# Patient Record
Sex: FEMALE | URBAN - METROPOLITAN AREA
[De-identification: names, ages, dates, MRNs, and addresses within clinical notes are randomized per-mention and may not be internally consistent; named-entity substitution may affect disease eponyms.]

---

## 2020-02-19 ENCOUNTER — APPOINTMENT (RX ONLY)
Dept: URBAN - METROPOLITAN AREA CLINIC 23 | Facility: CLINIC | Age: 30
Setting detail: DERMATOLOGY
End: 2020-02-19

## 2020-02-19 DIAGNOSIS — Z41.9 ENCOUNTER FOR PROCEDURE FOR PURPOSES OTHER THAN REMEDYING HEALTH STATE, UNSPECIFIED: ICD-10-CM

## 2020-02-19 PROCEDURE — ? FILLERS

## 2020-02-19 PROCEDURE — ? IN-HOUSE DISPENSING PHARMACY

## 2020-02-19 NOTE — PROCEDURE: IN-HOUSE DISPENSING PHARMACY
Product 49 Refills: 0
Product 32 Unit Type: mg
Product 1 Application Directions: Apply to eyelids am and pm
Product 3 Amount/Unit (Numbers Only): 1
Product 15 Application Directions: Take one tablet twice daily for 3-6 months
Name Of Product 8: Valium 5 mg
Name Of Product 5: Prednisone 20mg
Product 13 Refills: 2
Product 15 Amount/Unit (Numbers Only): 2614 [facility]
Product 1 Unit Type: ml
Product 2 Unit Type: jar(s)
Product 6 Application Directions: Apply pea size amount to entire face at bedtime only.
Product 10 Application Directions: Take two tablets with water prior to procedure.
Product 11 Application Directions: Apply to the affected area of the body daily.
Product 12 Application Directions: Apply to the entire face in the Am and Pm
Name Of Product 2: Ava West
Product 21 Application Directions: Use as directed
Product 8 Application Directions: Take one tablet today one hour prior to procedure as indicated.
Product 6 Amount/Unit (Numbers Only): 6058 [facility]
Name Of Product 1: Latiesse 3 ml
Product 21 Unit Type: kit(s)
Product 6 Unit Type: grams
Product 4 Application Directions: Take 1 tablet today in office post injections  for swelling as indicated.
Product 12 Unit Type: bottle(s)
Name Of Product 7: Latisse 5ml
Name Of Product 13: Exfoliate Glycolic Cleanser
Product 4 Unit Type: tablets
Send Charges To Patient Encounter: No
Product 1 Amount/Unit (Numbers Only): 3
Product 7 Application Directions: Apply to each eye lashes at bedtime
Product 13 Application Directions: Wash the face in the AM and PM
Product 14 Application Directions: Take one tablet in the Am and Pm x 3 days
Product 9 Application Directions: Apply to affected area perioral area at bedtime only as indicated.
Name Of Product 3: Prednisone 10 mg
Product 5 Application Directions: Take one tablet by mouth tomorrow as indicated for swelling
Product 10 Amount/Unit (Numbers Only): 5
Product 14 Amount/Unit (Numbers Only): [address]
Name Of Product 15: Viviscal PRO
Product 1 Price/Unit (In Dollars): 0.00
Name Of Product 21: Defenage skincare
Product 2 Application Directions: Apply at bedtime
Product 3 Application Directions: Take 1 tablet today and then 1 tablet tommorow  as indicated
Name Of Product 14: Valtrex 500mg
Name Of Product 10: Valium
Name Of Product 6: Tretinoin 0.05% cream
Name Of Product 12: Skin Better Even tone Serum
Name Of Product 11: Hydroquinone 6% pads
Detail Level: Detailed
Name Of Product 9: Brightening pads 6%

## 2020-02-19 NOTE — PROCEDURE: FILLERS
Additional Area 5 Location: oral commissures, upper lip lines, vermillion lips
Jawline Filler Volume In Cc: 0
Map Statment: See [address] for Complete Details
Lot #: [id number]
Include Cannula Brand?: DermaSculpt
Expiration Date (Month Year): 03/24/21
Consent: Written consent obtained. Risks include but not limited to bruising, beading, irregular texture, ulceration, infection, allergic reaction, scar formation, incomplete augmentation, temporary nature, procedural pain.
Additional Area 2 Location: Remaining syringe will be injected in 7 days post hydase injections to tear through areas.
Lot #: [id number]
Include Cannula Information In Note?: No
Filler: Restylane Defyne
Post-Care Instructions: Patient instructed to apply ice to reduce swelling.
Include Cannula Size?: 25G
Additional Area 3 Location: vermillion lips, tear through, lateral cheeks
Additional Area 4 Location: oral commissures, jawline, marionette lines
Lot #: [id number]
Include Cannula Length?: 1.5 inch
Additional Area 5 Location: lateral cheeks, NLFs,chin
Additional Area 1 Location: lateral jawline.
Expiration Date (Month Year): 05/19/2020
Anesthesia Type: 1% lidocaine with epinephrine
Detail Level: Detailed
Additional Area 2 Location: oral commissures ,marionettes lines , upper lip lines
Anesthesia Volume In Cc: 0.3
Additional Area 3 Location: perioral fine lines, vermillion lips
Additional Area 4 Location: vermillion lips, marionettes lines
Price (Use Numbers Only, No Special Characters Or $): 0.00
Additional Area 1 Location: chin
Additional Area 5 Volume In Cc: 1
Additional Area 5 Location: jawlines, lateral cheeks
Additional Anesthesia Volume In Cc: 6
Lot #: [id number]
Additional Area 1 Location: lateral cheeks, NLFâs
Expiration Date (Month Year): 94/30/21

## 2020-06-16 ENCOUNTER — APPOINTMENT (RX ONLY)
Dept: URBAN - METROPOLITAN AREA CLINIC 23 | Facility: CLINIC | Age: 30
Setting detail: DERMATOLOGY
End: 2020-06-16

## 2020-06-16 DIAGNOSIS — Z41.9 ENCOUNTER FOR PROCEDURE FOR PURPOSES OTHER THAN REMEDYING HEALTH STATE, UNSPECIFIED: ICD-10-CM

## 2020-06-16 PROCEDURE — ? SCULPTRA

## 2020-06-16 PROCEDURE — ? PICOWAY LASER

## 2020-06-16 PROCEDURE — ? PULSED-DYE LASER

## 2020-06-16 PROCEDURE — ? FILLERS

## 2020-06-16 NOTE — PROCEDURE: FILLERS
Brows Filler  Volume In Cc: 0
Additional Area 3 Location: marionette lines, oral commissures chin lines
Lot #: [id number]
Map Statment: See [address] for Complete Details
Include Cannula Length?: 1.5 inch
Include Cannula Size?: 25G
Include Cannula Information In Note?: No
Additional Area 1 Location: tear through, lateral mouth, marionette lines
Expiration Date (Month Year): 06/30/22
Anesthesia Type: 1% lidocaine with epinephrine
Additional Area 4 Location: cheeks, oral commissures, marionette lines
Vermilion Lips Filler Volume In Cc: 1
Lot #: [id number]
Additional Area 5 Location: jawline, lateral cheeks
Additional Area 1 Location: lateral mouth, marionette lines, lateral cheeks.
Additional Area 2 Location: oral commissures ,marionettes lines , upper lip lines
Anesthesia Volume In Cc: 0.3
Expiration Date (Month Year): 05/19/2020
Include Cannula Brand?: DermaSculpt
Lot #: [id number]
Additional Area 3 Location: perioral fine lines, vermillion lips, NLFs. marionette lines upper lip lines
Additional Anesthesia Volume In Cc: 6
Additional Area 1 Location: chin
Additional Area 4 Location: era lobes, jawline
Expiration Date (Month Year): 01/312022
Detail Level: Detailed
Additional Area 5 Location: left cheek
Additional Area 1 Location: lips, vermillion lips, chin
Price (Use Numbers Only, No Special Characters Or $): 0.00
Consent: Written consent obtained. Risks include but not limited to bruising, beading, irregular texture, ulceration, infection, allergic reaction, scar formation, incomplete augmentation, temporary nature, procedural pain.
Lot #: [id number]
Post-Care Instructions: Patient instructed to apply ice to reduce swelling.
Filler: Restylane-L
Additional Area 5 Location: oral commissures, upper lip lines, vermillion lips

## 2020-06-16 NOTE — PROCEDURE: [place] LASER
Total Pulses: 5 HZ
Post-Procedure Care: Immediate endpoint: perifollicular erythema and edema. Vaseline and ice applied. Post care reviewed with patient.
Wavelength: 1064 nm
Wavelength: 532 nm
Fluence (J/Cm2): 0.4
Hide Pulse Duration?: No
Pulse Duration: 2.5 ms
Fluence (J/Cm2): 1.9
Treatment Number: 0
Fluence (J/Cm2): 0.7
Spot Size: 6.0 mm
Detail Level: Detailed
Consent: Written consent obtained, risks reviewed including but not limited to crusting, scabbing, blistering, scarring, darker or lighter pigmentary change, paradoxical hair regrowth, incomplete removal of hair and infection.
Total Pulses: 1 pass
Post-Care Instructions: I reviewed with the patient in detail post-care instructions. Patient should avoid sun for a minimum of 4 weeks before and after treatment.
Total Pulses: 2 passes
Anesthesia Type: 1% lidocaine with epinephrine
Location Override: test spot (brown spot face)
Total Pulses: Hz- 6 Hz.
Spot Size: 8.0 mm
Pre-Procedure: Prior to proceeding the treatment areas were cleaned and all present put on their eye protection.

## 2020-06-16 NOTE — PROCEDURE: SCULPTRA
Show First Additional Location?: Yes
Additional Area 4 Volume In Cc: 0
Anesthesia Type: 1% lidocaine with epinephrine
Vials Reconstituted: 1
Show Right And Left Temple Volume?: No
Lot #: [id number]
Additional Area 1 Location: buttocks
Post-Care Instructions: Patient instructed to apply ice to reduce swelling.
Cheeks Volume In Cc: 8
Anesthesia Volume In Cc: 2
Detail Level: Detailed
Dilution Method: The Sculptra was diluted with 10 of saline water and 1cc of plain lidocaine  for a total volume of 11ccs per vial.
Expiration Date (Month Year): 11/30/22
Additional Area 2 Location: cheeks, lateral jawline, NLFs, lateral cheeks (cannula used)
Injection Technique: The Sculptra was injected using a cannula to the listed areas after cleansing the skin and providing appropriate anesthesia.
Consent: Written consent obtained. Risks include but not limited to bruising, beading, irregular texture, ulceration, infection, allergic reaction, scar formation, incomplete augmentation, temporary nature, procedural pain.
Volumizer: Sculptra
Additional Anesthesia Volume In Cc: 6
Map Statement: See Attached Map for Complete Details.

## 2020-06-16 NOTE — PROCEDURE: PULSED-DYE LASER
Cryogen Time (Ms): 59968 Renato Yanez
Cryogen Time (Ms): 10
Pulse Duration: 10 ms
Spot Size: 10 mm
Fluence In J/Cm2 (Optional): 6.50
Consent: Written consent obtained, risks reviewed including but not limited to crusting, scabbing, blistering, scarring, darker or lighter pigmentary change, incidental hair removal, bruising, and/or incomplete removal.
Treated Area: large area
Spot Size: 7 mm
Post-Care Instructions: I reviewed with the patient in detail post-care instructions. Patient should stay away from the sun and wear sun protection until treated areas are fully healed.
Laser Type: Vbeam 595nm
Treated Area: small area
Fluence In J/Cm2 (Optional): 11.00
Delay Time (Ms): 5159 [facility]
Delay Time (Ms): 20
Detail Level: Detailed
Cryogen Time (Ms): 30
Immediate Endpoint: purpura
Spot Size: 10x3 mm
Cryogen Time (Ms): 27

## 2020-06-17 ENCOUNTER — RX ONLY (OUTPATIENT)
Age: 30
Setting detail: RX ONLY
End: 2020-06-17

## 2020-06-22 ENCOUNTER — APPOINTMENT (RX ONLY)
Dept: URBAN - METROPOLITAN AREA CLINIC 23 | Facility: CLINIC | Age: 30
Setting detail: DERMATOLOGY
End: 2020-06-22

## 2020-06-22 DIAGNOSIS — Z41.9 ENCOUNTER FOR PROCEDURE FOR PURPOSES OTHER THAN REMEDYING HEALTH STATE, UNSPECIFIED: ICD-10-CM

## 2020-06-22 PROCEDURE — ? ADDITIONAL NOTES

## 2020-06-22 PROCEDURE — ? PULSED-DYE LASER

## 2020-06-22 NOTE — PROCEDURE: PULSED-DYE LASER
Pulse Duration: 10 ms
Fluence In J/Cm2 (Optional): 6.5
Cryogen Time (Ms): 03420 Renato Yanez
Laser Type: Vbeam 595nm
Spot Size: 10 mm
Immediate Endpoint: purpura
Pulse Duration: 6 ms
Cryogen Time (Ms): 30
Consent: Written consent obtained, risks reviewed including but not limited to crusting, scabbing, blistering, scarring, darker or lighter pigmentary change, incidental hair removal, bruising, and/or incomplete removal.
Delay Time (Ms): 2513 [facility]
Cryogen Time (Ms): 10
Location (Required For Details To Render In Note But Body Touch Will Also Count For First Location): buttocks
Detail Level: Detailed
Delay Time (Ms): 20
Treated Area: small area
Post-Care Instructions: I reviewed with the patient in detail post-care instructions. Patient should stay away from the sun and wear sun protection until treated areas are fully healed.
Treated Area: large area
Fluence In J/Cm2 (Optional): 7:50
Fluence In J/Cm2 (Optional): 6.50
Spot Size: 7 mm
Post-Procedure Care: Post profound bruising abdomen area

## 2020-06-26 ENCOUNTER — APPOINTMENT (RX ONLY)
Dept: URBAN - METROPOLITAN AREA CLINIC 23 | Facility: CLINIC | Age: 30
Setting detail: DERMATOLOGY
End: 2020-06-26

## 2020-06-26 VITALS — TEMPERATURE: 97.7 F

## 2020-06-26 DIAGNOSIS — Z41.9 ENCOUNTER FOR PROCEDURE FOR PURPOSES OTHER THAN REMEDYING HEALTH STATE, UNSPECIFIED: ICD-10-CM

## 2020-06-26 PROCEDURE — ? FILLERS

## 2020-06-26 PROCEDURE — ? HYALURONIDASE INJECTION

## 2020-06-26 ASSESSMENT — LOCATION ZONE DERM: LOCATION ZONE: LIP

## 2020-06-26 ASSESSMENT — LOCATION SIMPLE DESCRIPTION DERM: LOCATION SIMPLE: RIGHT LIP

## 2020-06-26 ASSESSMENT — LOCATION DETAILED DESCRIPTION DERM: LOCATION DETAILED: RIGHT INFERIOR VERMILION LIP

## 2020-06-26 NOTE — PROCEDURE: HYALURONIDASE INJECTION
Consent: The risks of contour defects and dimpling of the skin were reviewed with the patient prior to the injection.
Total Volume (Ccs): 0.1
Expiration Date (Optional): 08/2021
Administered By (Optional): Dr. Atif Fuchs
Detail Level: Detailed
Hyaluronidase Preparation: hyaluronidase 150 USP units/ml
Filler Previously Used (Optional): Restylane
Lot # (Optional): [id number]

## 2020-06-26 NOTE — PROCEDURE: FILLERS
Jawline Filler Volume In Cc: 0
Detail Level: Detailed
Additional Area 4 Location: cheeks, oral commissures, marionette lines
Include Cannula Length?: 1.5 inch
Additional Area 2 Location: oral commissures ,marionettes lines , upper lip lines
Lot #: [id number]
Additional Area 1 Location: lateral mouth, marionette lines, lateral cheeks.
Additional Area 5 Location: NLFS, jawline (cannula used)
Additional Area 3 Location: perioral fine lines, vermillion lips, NLFs. marionette lines upper lip lines
Consent: Written consent obtained. Risks include but not limited to bruising, beading, irregular texture, ulceration, infection, allergic reaction, scar formation, incomplete augmentation, temporary nature, procedural pain.
Expiration Date (Month Year): 05/19/2020
Include Cannula Brand?: DermaSculpt
Additional Area 4 Location: era lobes, jawline
Price (Use Numbers Only, No Special Characters Or $): 0.00
Post-Care Instructions: Patient instructed to apply ice to reduce swelling.
Lot #: [id number]
Include Cannula Information In Note?: No
Anesthesia Type: 1% lidocaine with epinephrine
Include Cannula Size?: 25G
Additional Area 5 Location: left cheek
Expiration Date (Month Year): 01/31/22
Anesthesia Volume In Cc: 0.3
Lot #: [id number]
Map Statment: See [address] for Complete Details
Additional Area 5 Location: oral commissures, upper lip lines, vermillion lips
Additional Area 1 Location: chin
Expiration Date (Month Year): 02/28/22
Additional Anesthesia Volume In Cc: 6
Lot #: [id number]
Additional Area 1 Location: lips ( remaining syringe)
Expiration Date (Month Year): 03/01/20
Additional Area 1 Volume In Cc: 0.2
Additional Area 2 Location: chin line, vermillion lips, lateral mouth, upper line brow, lateral cheeks
Filler: Restylane Kysse
Additional Area 1 Location: lips, vermillion lips.
Additional Area 3 Location: Fine lines upper lip

## 2020-07-08 ENCOUNTER — APPOINTMENT (RX ONLY)
Dept: URBAN - METROPOLITAN AREA CLINIC 23 | Facility: CLINIC | Age: 30
Setting detail: DERMATOLOGY
End: 2020-07-08

## 2020-07-08 VITALS — TEMPERATURE: 97.9 F

## 2020-07-08 DIAGNOSIS — Z41.9 ENCOUNTER FOR PROCEDURE FOR PURPOSES OTHER THAN REMEDYING HEALTH STATE, UNSPECIFIED: ICD-10-CM

## 2020-07-08 PROCEDURE — ? HYALURONIDASE INJECTION

## 2020-07-08 PROCEDURE — ? ADDITIONAL NOTES

## 2020-07-08 PROCEDURE — ? PULSED-DYE LASER

## 2020-07-08 ASSESSMENT — LOCATION SIMPLE DESCRIPTION DERM: LOCATION SIMPLE: LEFT LIP

## 2020-07-08 ASSESSMENT — LOCATION ZONE DERM: LOCATION ZONE: LIP

## 2020-07-08 ASSESSMENT — LOCATION DETAILED DESCRIPTION DERM: LOCATION DETAILED: LEFT INFERIOR VERMILION LIP

## 2020-07-08 NOTE — PROCEDURE: ADDITIONAL NOTES
Additional Notes: Laser: [facility]\\n\\n\\nLocation upper lip \\nWavelength:1064 zoom\\nSpot size 4mm \\nFluence:0.60 jcm2\\nPasses 2\\nHz:5 \\n\\n\\n\\n\\n\\nWritten consent was obtained, risks reviewed including but not limited to crusting, scabbing, blistering, scarring, darker or lighter pigmentary change, paradoxical hair regrowth, incomplete removal of hair and infection. Prior to perifollicular erythema and edema. Vaseline and ice applied. Post care reviewed with patient.
Detail Level: Simple

## 2020-07-08 NOTE — PROCEDURE: HYALURONIDASE INJECTION
Lot # (Optional): [id number]
Expiration Date (Optional): 08/2021
Hyaluronidase Preparation: hyaluronidase 150 USP units/ml
Detail Level: Detailed
Total Volume (Ccs): 0.1
Administered By (Optional): Dr. Adeel Gutierres
Filler Previously Used (Optional): Restylane
Consent: The risks of contour defects and dimpling of the skin were reviewed with the patient prior to the injection.

## 2020-07-08 NOTE — PROCEDURE: PULSED-DYE LASER
Delay Time (Ms): 10
Pulse Duration: 6 ms
Cryogen Time (Ms): 77875 Rneato Yanez
Post-Procedure Care: Post profound bruising abdomen area
Spot Size: 10 mm
Location Override: post injections
Detail Level: Detailed
Delay Time (Ms): 0810 [facility]
Pulse Duration: 10 ms
Consent: Written consent obtained, risks reviewed including but not limited to crusting, scabbing, blistering, scarring, darker or lighter pigmentary change, incidental hair removal, bruising, and/or incomplete removal.
Fluence In J/Cm2 (Optional): 7:50
Spot Size: 10x3 mm
Delay Time (Ms): 20
Cryogen Time (Ms): 27
Spot Size: 7 mm
Laser Type: Vbeam 595nm
Treated Area: small area
Immediate Endpoint: purpura
Treated Area: large area
Fluence In J/Cm2 (Optional): 12:00
Fluence In J/Cm2 (Optional): 6.50
Cryogen Time (Ms): 30
Post-Care Instructions: I reviewed with the patient in detail post-care instructions. Patient should stay away from the sun and wear sun protection until treated areas are fully healed.

## 2020-08-19 ENCOUNTER — APPOINTMENT (RX ONLY)
Dept: URBAN - METROPOLITAN AREA CLINIC 23 | Facility: CLINIC | Age: 30
Setting detail: DERMATOLOGY
End: 2020-08-19

## 2020-08-19 DIAGNOSIS — Z41.9 ENCOUNTER FOR PROCEDURE FOR PURPOSES OTHER THAN REMEDYING HEALTH STATE, UNSPECIFIED: ICD-10-CM

## 2020-08-19 PROCEDURE — ? IN-HOUSE DISPENSING PHARMACY

## 2020-08-19 NOTE — PROCEDURE: IN-HOUSE DISPENSING PHARMACY
Product 52 Unit Type: mg
Product 15 Unit Type: tablets
Product 3 Amount/Unit (Numbers Only): 1
Product 6 Amount/Unit (Numbers Only): 6039 [facility]
Product 77 Refills: 0
Name Of Product 21: Defenage skincare
Name Of Product 11: Hydroquinone 6% pads
Product 13 Refills: 2
Product 11 Application Directions: Apply to the face once at night.
Product 5 Application Directions: Take one tablet by mouth tomorrow as indicated for swelling
Product 8 Application Directions: Take one tablet today one hour prior to procedure as indicated.
Product 1 Price/Unit (In Dollars): 0.00
Product 2 Application Directions: Apply thin layer at bedtime only as indicated.
Product 11 Unit Type: jar(s)
Product 21 Application Directions: Use as directed
Name Of Product 15: Viviscal PRO
Product 2 Unit Type: tube(s)
Product 1 Application Directions: Apply to eyelids at bedtime as indicated.
Product 7 Application Directions: Apply to each eye lashes at bedtime
Name Of Product 14: Valtrex 500mg
Product 17 Unit Type: bottle(s)
Product 10 Application Directions: Take two tablets with water prior to procedure.
Product 4 Application Directions: Take 1 tablet today in office post injections  for swelling as indicated and one tablet tomorrow as needed for swelling.
Product 15 Application Directions: Take one tablet twice daily for 3-6 months
Product 1 Unit Type: ml
Name Of Product 2: Georgina Hester
Name Of Product 8: Valium 5 mg
Product 14 Application Directions: Take one tablet in the Am and Pm x 3 days
Name Of Product 5: Prednisone 20mg
Render Refills If Set To 0: No
Product 14 Amount/Unit (Numbers Only): [address]
Product 6 Unit Type: grams
Name Of Product 1: Latiesse 5 ml
Product 13 Application Directions: Wash the face in the AM and PM
Name Of Product 10: Valium
Name Of Product 20: Apply pea size amount to entire face at bedtime only at bedtime only.
Name Of Product 7: Latisse 5ml
Product 17 Application Directions: Apply thin layer to face at bedtime only.
Name Of Product 13: Exfoliate Glycolic Cleanser
Name Of Product 12: Skin Better Even tone Serum
Name Of Product 6: Tretinoin 0.05% cream
Name Of Product 9: Brightening pads 8%
Name Of Product 3: Prednisone 10 mg
Product 21 Unit Type: kit(s)
Detail Level: Detailed
Product 1 Amount/Unit (Numbers Only): 5
Product 19 Application Directions: Tretinoin 0.05%
Product 15 Amount/Unit (Numbers Only): 2614 [facility]
Product 12 Application Directions: Apply to the entire face in the Am and Pm
Product 6 Application Directions: Apply pea size amount to entire face at bedtime only.
Product 9 Application Directions: Apply to affected area face am only as indicated.
Product 3 Application Directions: Take 1 tablet-today and one tablets tomorrow as indicated for swelling.
Name Of Product 17: Kia Parra

## 2020-10-20 ENCOUNTER — APPOINTMENT (RX ONLY)
Dept: URBAN - METROPOLITAN AREA CLINIC 23 | Facility: CLINIC | Age: 30
Setting detail: DERMATOLOGY
End: 2020-10-20

## 2020-10-20 ENCOUNTER — RX ONLY (OUTPATIENT)
Age: 30
Setting detail: RX ONLY
End: 2020-10-20

## 2020-10-20 VITALS — TEMPERATURE: 97.2 F

## 2020-10-20 DIAGNOSIS — L81.1 CHLOASMA: ICD-10-CM

## 2020-10-20 DIAGNOSIS — L70.0 ACNE VULGARIS: ICD-10-CM

## 2020-10-20 DIAGNOSIS — Z41.9 ENCOUNTER FOR PROCEDURE FOR PURPOSES OTHER THAN REMEDYING HEALTH STATE, UNSPECIFIED: ICD-10-CM

## 2020-10-20 PROCEDURE — ? HYALURONIDASE INJECTION

## 2020-10-20 PROCEDURE — ? RECOMMENDATIONS

## 2020-10-20 PROCEDURE — 99213 OFFICE O/P EST LOW 20 MIN: CPT

## 2020-10-20 PROCEDURE — ? BOTOX

## 2020-10-20 PROCEDURE — ? SKIN MEDICINALS

## 2020-10-20 PROCEDURE — ? COUNSELING

## 2020-10-20 PROCEDURE — ? PULSED-DYE LASER

## 2020-10-20 PROCEDURE — ? PRESCRIPTION

## 2020-10-20 RX ORDER — DAPSONE 75 MG/G
GEL TOPICAL QD
Qty: 1 | Refills: 2 | Status: CANCELLED | COMMUNITY
Start: 2020-10-20

## 2020-10-20 RX ORDER — DOXYCYCLINE HYCLATE 100 MG/1
CAPSULE, GELATIN COATED ORAL BID
Qty: 21 | Refills: 2 | Status: ERX | COMMUNITY
Start: 2020-10-20

## 2020-10-20 RX ORDER — DAPSONE 75 MG/G
1 GEL TOPICAL QD
Qty: 1 | Refills: 2 | Status: ERX | COMMUNITY
Start: 2020-10-20

## 2020-10-20 RX ADMIN — DAPSONE: 75 GEL TOPICAL at 00:00

## 2020-10-20 RX ADMIN — DOXYCYCLINE HYCLATE: 100 CAPSULE, GELATIN COATED ORAL at 00:00

## 2020-10-20 ASSESSMENT — LOCATION DETAILED DESCRIPTION DERM
LOCATION DETAILED: LEFT INFERIOR VERMILION LIP
LOCATION DETAILED: RIGHT INFERIOR CENTRAL MALAR CHEEK
LOCATION DETAILED: LEFT INFERIOR CENTRAL MALAR CHEEK

## 2020-10-20 ASSESSMENT — LOCATION SIMPLE DESCRIPTION DERM
LOCATION SIMPLE: LEFT CHEEK
LOCATION SIMPLE: RIGHT CHEEK
LOCATION SIMPLE: LEFT LIP

## 2020-10-20 ASSESSMENT — LOCATION ZONE DERM
LOCATION ZONE: FACE
LOCATION ZONE: LIP

## 2020-10-20 NOTE — PROCEDURE: RECOMMENDATIONS
Recommendation Preamble: The following recommendations were made during the visit: patient to monitor for any abnormalities and to follow up in 2 months
Detail Level: Detailed
Recommendations (Free Text): Exceed microneedling with PRP

## 2020-10-20 NOTE — HPI: PIMPLES (ACNE)
How Severe Is Your Acne?: mild
Is This A New Presentation, Or A Follow-Up?: Acne
Additional Comments (Use Complete Sentences): She stopped her OCP for a week and then her face broke out.

## 2020-10-20 NOTE — PROCEDURE: PULSED-DYE LASER
Spot Size: 7 mm
Spot Size: 10x3 mm
Pulse Duration: 10 ms
Immediate Endpoint: purpura
Spot Size: 10 mm
Pulse Duration: 6 ms
Laser Type: Vbeam 595nm
Cryogen Time (Ms): 33609 Renato Yanez
Cryogen Time (Ms): 30
Consent: Written consent obtained, risks reviewed including but not limited to crusting, scabbing, blistering, scarring, darker or lighter pigmentary change, incidental hair removal, bruising, and/or incomplete removal.
Cryogen Time (Ms): 10
Location (Required For Details To Render In Note But Body Touch Will Also Count For First Location): lower cutaneous lip
Detail Level: Detailed
Fluence In J/Cm2 (Optional): 6.50
Delay Time (Ms): 6175 [facility]
Treated Area: large area
Delay Time (Ms): 20
Fluence In J/Cm2 (Optional): 12.00
Treated Area: small area
Post-Care Instructions: I reviewed with the patient in detail post-care instructions. Patient should stay away from the sun and wear sun protection until treated areas are fully healed.

## 2020-10-20 NOTE — PROCEDURE: RECOMMENDATIONS
Recommendation Preamble: The following recommendations were made during the visit: patient to monitor for any abnormalities and to follow up in 2 months
Detail Level: Detailed
Recommendations (Free Text): Vi Peel\\nEmatrix : $500 recommended with or without PRP\\nExceed microneedling with PRP

## 2020-10-20 NOTE — PROCEDURE: [facility]
Sig: Wash affected areas daily.
Sig: Apply pea sized amount per area at night
Sig: Apply twice daily for 5 days
Sig: Apply to affected areas twice daily
Sig: Apply to affected areas on face twice daily
Sig: Apply to affected areas on face once daily
Sig: Apply a thin layer to affected areas twice daily for 6 weeks
Intro Statement: I recommended the following products:
Acne Medicines: Tretinoin 0.05%, Niacinamide 2%, Azelaic 8%, Hyaluronic Acid 0.25% Cream
Sig: Apply nightly to warts nightly under occlusion
Detail Level: Simple
Product Type (1): Acne

## 2020-10-20 NOTE — PROCEDURE: COUNSELING
Tazorac Counseling:  Patient advised that medication is irritating and drying. Patient may need to apply sparingly and wash off after an hour before eventually leaving it on overnight. The patient verbalized understanding of the proper use and possible adverse effects of tazorac. All of the patient's questions and concerns were addressed.
Dapsone Counseling: I discussed with the patient the risks of dapsone including but not limited to hemolytic anemia, agranulocytosis, rashes, methemoglobinemia, kidney failure, peripheral neuropathy, headaches, GI upset, and liver toxicity. Patients who start dapsone require monitoring including baseline LFTs and weekly CBCs for the first month, then every month thereafter. The patient verbalized understanding of the proper use and possible adverse effects of dapsone. All of the patient's questions and concerns were addressed.
Benzoyl Peroxide Counseling: Patient counseled that medicine may cause skin irritation and bleach clothing. In the event of skin irritation, the patient was advised to reduce the amount of the drug applied or use it less frequently. The patient verbalized understanding of the proper use and possible adverse effects of benzoyl peroxide. All of the patient's questions and concerns were addressed.
Bactrim Pregnancy And Lactation Text: This medication is Pregnancy Category D and is known to cause fetal risk. It is also excreted in breast milk.
Minocycline Pregnancy And Lactation Text: This medication is Pregnancy Category D and not consider safe during pregnancy. It is also excreted in breast milk.
Erythromycin Counseling:  I discussed with the patient the risks of erythromycin including but not limited to GI upset, allergic reaction, drug rash, diarrhea, increase in liver enzymes, and yeast infections.
Topical Sulfur Applications Counseling: Topical Sulfur Counseling: Patient counseled that this medication may cause skin irritation or allergic reactions. In the event of skin irritation, the patient was advised to reduce the amount of the drug applied or use it less frequently. The patient verbalized understanding of the proper use and possible adverse effects of topical sulfur application. All of the patient's questions and concerns were addressed.
Spironolactone Pregnancy And Lactation Text: This medication can cause feminization of the male fetus and should be avoided during pregnancy. The active metabolite is also found in breast milk.
Azithromycin Counseling:  I discussed with the patient the risks of azithromycin including but not limited to GI upset, allergic reaction, drug rash, diarrhea, and yeast infections.
Topical Sulfur Applications Pregnancy And Lactation Text: This medication is Pregnancy Category C and has an unknown safety profile during pregnancy. It is unknown if this topical medication is excreted in breast milk.
High Dose Vitamin A Counseling: Side effects reviewed, pt to contact office should one occur.
Tazorac Pregnancy And Lactation Text: This medication is not safe during pregnancy. It is unknown if this medication is excreted in breast milk.
Dapsone Pregnancy And Lactation Text: This medication is Pregnancy Category C and is not considered safe during pregnancy or breast feeding.
Benzoyl Peroxide Pregnancy And Lactation Text: This medication is Pregnancy Category C. It is unknown if benzoyl peroxide is excreted in breast milk.
Birth Control Pills Counseling: Birth Control Pill Counseling: I discussed with the patient the potential side effects of OCPs including but not limited to increased risk of stroke, heart attack, thrombophlebitis, deep venous thrombosis, hepatic adenomas, breast changes, GI upset, headaches, and depression. The patient verbalized understanding of the proper use and possible adverse effects of OCPs. All of the patient's questions and concerns were addressed.
Sarecycline Counseling: Patient advised regarding possible photosensitivity and discoloration of the teeth, skin, lips, tongue and gums. Patient instructed to avoid sunlight, if possible. When exposed to sunlight, patients should wear protective clothing, sunglasses, and sunscreen. The patient was instructed to call the office immediately if the following severe adverse effects occur:  hearing changes, easy bruising/bleeding, severe headache, or vision changes. The patient verbalized understanding of the proper use and possible adverse effects of sarecycline. All of the patient's questions and concerns were addressed.
Tetracycline Counseling: Patient counseled regarding possible photosensitivity and increased risk for sunburn. Patient instructed to avoid sunlight, if possible. When exposed to sunlight, patients should wear protective clothing, sunglasses, and sunscreen. The patient was instructed to call the office immediately if the following severe adverse effects occur:  hearing changes, easy bruising/bleeding, severe headache, or vision changes. The patient verbalized understanding of the proper use and possible adverse effects of tetracycline. All of the patient's questions and concerns were addressed. Patient understands to avoid pregnancy while on therapy due to potential birth defects.
Erythromycin Pregnancy And Lactation Text: This medication is Pregnancy Category B and is considered safe during pregnancy. It is also excreted in breast milk.
High Dose Vitamin A Pregnancy And Lactation Text: High dose vitamin A therapy is contraindicated during pregnancy and breast feeding.
Topical Clindamycin Counseling: Patient counseled that this medication may cause skin irritation or allergic reactions. In the event of skin irritation, the patient was advised to reduce the amount of the drug applied or use it less frequently. The patient verbalized understanding of the proper use and possible adverse effects of clindamycin. All of the patient's questions and concerns were addressed.
Doxycycline Counseling:  Patient counseled regarding possible photosensitivity and increased risk for sunburn. Patient instructed to avoid sunlight, if possible. When exposed to sunlight, patients should wear protective clothing, sunglasses, and sunscreen. The patient was instructed to call the office immediately if the following severe adverse effects occur:  hearing changes, easy bruising/bleeding, severe headache, or vision changes. The patient verbalized understanding of the proper use and possible adverse effects of doxycycline. All of the patient's questions and concerns were addressed.
Topical Retinoid counseling:  Patient advised to apply a pea-sized amount only at bedtime and wait 30 minutes after washing their face before applying. If too drying, patient may add a non-comedogenic moisturizer. The patient verbalized understanding of the proper use and possible adverse effects of retinoids. All of the patient's questions and concerns were addressed.
Azithromycin Pregnancy And Lactation Text: This medication is considered safe during pregnancy and is also secreted in breast milk.
Include Pregnancy/Lactation Warning?: No
Isotretinoin Counseling: Patient should get monthly blood tests, not donate blood, not drive at night if vision affected, not share medication, and not undergo elective surgery for 6 months after tx completed. Side effects reviewed, pt to contact office should one occur.
Detail Level: Zone
Birth Control Pills Pregnancy And Lactation Text: This medication should be avoided if pregnant and for the first 30 days post-partum.
Minocycline Counseling: Patient advised regarding possible photosensitivity and discoloration of the teeth, skin, lips, tongue and gums. Patient instructed to avoid sunlight, if possible. When exposed to sunlight, patients should wear protective clothing, sunglasses, and sunscreen. The patient was instructed to call the office immediately if the following severe adverse effects occur:  hearing changes, easy bruising/bleeding, severe headache, or vision changes. The patient verbalized understanding of the proper use and possible adverse effects of minocycline. All of the patient's questions and concerns were addressed.
Topical Clindamycin Pregnancy And Lactation Text: This medication is Pregnancy Category B and is considered safe during pregnancy. It is unknown if it is excreted in breast milk.
Doxycycline Pregnancy And Lactation Text: This medication is Pregnancy Category D and not consider safe during pregnancy. It is also excreted in breast milk but is considered safe for shorter treatment courses.
Topical Retinoid Pregnancy And Lactation Text: This medication is Pregnancy Category C. It is unknown if this medication is excreted in breast milk.
Bactrim Counseling:  I discussed with the patient the risks of sulfa antibiotics including but not limited to GI upset, allergic reaction, drug rash, diarrhea, dizziness, photosensitivity, and yeast infections. Rarely, more serious reactions can occur including but not limited to aplastic anemia, agranulocytosis, methemoglobinemia, blood dyscrasias, liver or kidney failure, lung infiltrates or desquamative/blistering drug rashes.
Spironolactone Counseling: Patient advised regarding risks of diarrhea, abdominal pain, hyperkalemia, birth defects (for female patients), liver toxicity and renal toxicity. The patient may need blood work to monitor liver and kidney function and potassium levels while on therapy. The patient verbalized understanding of the proper use and possible adverse effects of spironolactone. All of the patient's questions and concerns were addressed.
Isotretinoin Pregnancy And Lactation Text: This medication is Pregnancy Category X and is considered extremely dangerous during pregnancy. It is unknown if it is excreted in breast milk.

## 2020-10-20 NOTE — PROCEDURE: BOTOX
Lateral Platysmal Bands Units: 0
Show Anterior Platysmal Band Units: Yes
Additional Area 4 Location: lateral eyes
Show Lcl Units: No
Expiration Date (Month Year): 11/22
Post-Care Instructions: Patient instructed to not lie down for 4 hours and limit physical activity for 24 hours. Patient instructed not to travel by airplane for 48 hours.
Dilution (U/0.1 Cc): 2.5
Additional Area 6 Location: chin
Additional Area 1 Location: high forehead (touch up)
Additional Area 2 Units: 4
Detail Level: Detailed
Consent: Written consent obtained. Risks include but not limited to lid/brow ptosis, bruising, swelling, diplopia, temporary effect, incomplete chemical denervation.
Additional Area 3 Location: neck bands
Glabellar Complex Units: [facility]
Additional Area 2 Location: lateral brows
Lot #: [id number]
Additional Area 5 Location: high forehead

## 2020-11-03 ENCOUNTER — APPOINTMENT (RX ONLY)
Dept: URBAN - METROPOLITAN AREA CLINIC 23 | Facility: CLINIC | Age: 30
Setting detail: DERMATOLOGY
End: 2020-11-03

## 2020-11-03 VITALS — TEMPERATURE: 96.6 F

## 2020-11-03 DIAGNOSIS — Z41.9 ENCOUNTER FOR PROCEDURE FOR PURPOSES OTHER THAN REMEDYING HEALTH STATE, UNSPECIFIED: ICD-10-CM

## 2020-11-03 PROCEDURE — ? HYALURONIDASE INJECTION

## 2020-11-03 PROCEDURE — ? PULSED-DYE LASER

## 2020-11-03 PROCEDURE — ? ADDITIONAL NOTES

## 2020-11-03 ASSESSMENT — LOCATION DETAILED DESCRIPTION DERM
LOCATION DETAILED: RIGHT INFERIOR VERMILION LIP
LOCATION DETAILED: LEFT INFERIOR VERMILION LIP

## 2020-11-03 ASSESSMENT — LOCATION SIMPLE DESCRIPTION DERM
LOCATION SIMPLE: LEFT LIP
LOCATION SIMPLE: RIGHT LIP

## 2020-11-03 ASSESSMENT — LOCATION ZONE DERM: LOCATION ZONE: LIP

## 2020-11-03 NOTE — PROCEDURE: ADDITIONAL NOTES
Detail Level: Detailed
Additional Notes: Explained to patient that it is best to have all of filler dissolved as much as possible before refilling. Sitting charge of $250 for next visit.

## 2020-11-03 NOTE — PROCEDURE: PULSED-DYE LASER
Consent: Written consent obtained, risks reviewed including but not limited to crusting, scabbing, blistering, scarring, darker or lighter pigmentary change, incidental hair removal, bruising, and/or incomplete removal.
Pulse Duration: .45 ms
Pulse Duration: 10 ms
Immediate Endpoint: erythema
Delay Time (Ms): [address] [facility]
Spot Size: 7 mm
Cryogen Time (Ms): [address] Renato Yanez
Immediate Endpoint: purpura
Delay Time (Ms): 20
Spot Size: 10 mm
Detail Level: Detailed
Fluence In J/Cm2 (Optional): 6.50
Cryogen Time (Ms): 30
Treated Area: large area
Pulse Duration: 6 ms
Post-Care Instructions: I reviewed with the patient in detail post-care instructions. Patient should stay away from the sun and wear sun protection until treated areas are fully healed.
Location Override: post injections
Delay Time (Ms): 10
Laser Type: Vbeam 595nm
Treated Area: small area
Fluence In J/Cm2 (Optional): 4.00

## 2020-11-03 NOTE — PROCEDURE: HYALURONIDASE INJECTION
Lot # (Optional): [id number]
Detail Level: Detailed
Consent: The risks of contour defects and dimpling of the skin were reviewed with the patient prior to the injection.
Expiration Date (Optional): 08/2021
Filler Previously Used (Optional): filler
Administered By (Optional): Dr. Tasha Meza
Total Volume (Ccs): 1
Hyaluronidase Preparation: hyaluronidase 150 USP units/ml

## 2020-11-19 ENCOUNTER — APPOINTMENT (RX ONLY)
Dept: URBAN - METROPOLITAN AREA CLINIC 23 | Facility: CLINIC | Age: 30
Setting detail: DERMATOLOGY
End: 2020-11-19

## 2020-11-19 DIAGNOSIS — Z41.9 ENCOUNTER FOR PROCEDURE FOR PURPOSES OTHER THAN REMEDYING HEALTH STATE, UNSPECIFIED: ICD-10-CM

## 2020-11-19 PROCEDURE — ? CHEMICAL PEEL

## 2020-11-19 PROCEDURE — ? HYALURONIDASE INJECTION

## 2020-11-19 ASSESSMENT — LOCATION SIMPLE DESCRIPTION DERM
LOCATION SIMPLE: LEFT CHEEK
LOCATION SIMPLE: LEFT LIP

## 2020-11-19 ASSESSMENT — LOCATION ZONE DERM
LOCATION ZONE: LIP
LOCATION ZONE: FACE

## 2020-11-19 ASSESSMENT — LOCATION DETAILED DESCRIPTION DERM
LOCATION DETAILED: LEFT INFERIOR MEDIAL MALAR CHEEK
LOCATION DETAILED: LEFT SUPERIOR VERMILION LIP

## 2020-11-19 NOTE — PROCEDURE: HYALURONIDASE INJECTION
Hyaluronidase Preparation: hyaluronidase 150 USP units/ml
Expiration Date (Optional): 08/2021
Filler Previously Used (Optional): filler
Lot # (Optional): [id number]
Detail Level: Detailed
Administered By (Optional): Dr. Adeel Gutierres
Consent: The risks of contour defects and dimpling of the skin were reviewed with the patient prior to the injection.
Total Volume (Ccs): 0.4

## 2020-11-19 NOTE — PROCEDURE: CHEMICAL PEEL
Chemical Peel: Vi
Post Peel Care: After the procedure, a post-peel cream was applied to the treated areas. Post-care instructions were reviewed with the patient.
Consent: Prior to the procedure, written consent was obtained and risks were reviewed, including but not limited to: redness, peeling, blistering, pigmentary change, scarring, infection, and pain.
Treatment Number: 0
Expiration Date (Optional): 08/22
Prep: The treated area was degreased with pre-peel cleanser, and vaseline was applied for protection of mucous membranes.
Detail Level: Zone
Lot Number (Optional): [id number]
Treatment Time (Optional): 3 minutes
Number Of Layers: 2
Post-Care Instructions: I reviewed with the patient in detail post-care instructions. Patient should avoid sun exposure and wear sun protection.

## 2020-12-03 ENCOUNTER — RX ONLY (OUTPATIENT)
Age: 30
Setting detail: RX ONLY
End: 2020-12-03

## 2020-12-03 RX ORDER — PHARMACY COMPOUNDING ACCESSORY
EACH MISCELLANEOUS
Qty: 1 | Refills: 0 | Status: ERX | COMMUNITY
Start: 2020-12-03

## 2021-01-07 ENCOUNTER — APPOINTMENT (RX ONLY)
Dept: URBAN - METROPOLITAN AREA CLINIC 23 | Facility: CLINIC | Age: 31
Setting detail: DERMATOLOGY
End: 2021-01-07

## 2021-01-07 VITALS — TEMPERATURE: 97.7 F

## 2021-01-07 DIAGNOSIS — Z41.9 ENCOUNTER FOR PROCEDURE FOR PURPOSES OTHER THAN REMEDYING HEALTH STATE, UNSPECIFIED: ICD-10-CM

## 2021-01-07 PROCEDURE — ? FILLERS

## 2021-01-07 PROCEDURE — ? BOTOX

## 2021-01-07 PROCEDURE — ? PICOWAY LASER

## 2021-01-07 PROCEDURE — ? IN-HOUSE DISPENSING PHARMACY

## 2021-01-07 NOTE — PROCEDURE: BOTOX
Show Right And Left Periorbital Units: No
L Brow Units: 0
Dilution (U/0.1 Cc): 1.2
Show Additional Area 5: Yes
Additional Area 5 Location: upper lip
Additional Area 3 Location: chin
Additional Area 1 Units: [address] [place] [address]
Post-Care Instructions: Patient instructed to not lie down for 4 hours and limit physical activity for 24 hours. Patient instructed not to travel by airplane for 48 hours.
Additional Area 2 Location: high forehead 2 cm above brow
Lot #: [id number]
Expiration Date (Month Year): 6/23
Consent: Written consent obtained. Risks include but not limited to lid/brow ptosis, bruising, swelling, diplopia, temporary effect, incomplete chemical denervation.
Additional Area 3 Units: 2
Additional Area 1 Location: high forehead
Additional Area 4 Location: neck bands
Detail Level: Detailed
Glabellar Complex Units: 8

## 2021-01-07 NOTE — PROCEDURE: FILLERS
Nasolabial Folds Filler Volume In Cc: 0
Expiration Date (Month Year): 05/19/2020
Additional Area 4 Location: era lobes, jawline
Include Cannula Information In Note?: No
Expiration Date (Month Year): 2022-04-30
Additional Area 5 Location: cheeks (cannula used
Include Cannula Size?: 25G
Consent: Written consent obtained. Risks include but not limited to bruising, beading, irregular texture, ulceration, infection, allergic reaction, scar formation, incomplete augmentation, temporary nature, procedural pain.
Include Cannula Length?: 1.5 inch
Anesthesia Type: 1% lidocaine with epinephrine
Additional Area 1 Location: chin
Include Cannula Brand?: DermaSculpt
Lot #: [id number]
Detail Level: Detailed
Anesthesia Volume In Cc: 0.3
Post-Care Instructions: Patient instructed to apply ice to reduce swelling.
Expiration Date (Month Year): 2023-01-31
Price (Use Numbers Only, No Special Characters Or $): 0.00
Additional Area 5 Location: oral commissures, upper lip lines, vermillion lips
Additional Area 3 Location: marionette and oral commissures,
Additional Area 1 Location: lateral jawline, lateral cheeks, jawline, temples.
Additional Anesthesia Volume In Cc: 6
Lot #: [id number]
Additional Area 2 Location: oral commissures ,marionettes lines ,perioral fine lines, NLFâs.
Additional Area 1 Location: marionette lines, NLF and cheeks
Additional Area 4 Location: lips
Expiration Date (Month Year): 11/2022
Filler: Restylane Kysse
Filler Comments: Physician sample
Additional Area 5 Location: cheeks, nose,
Additional Area 2 Location: lateral cheeks,oral commesure,chin and upper lip lines
Additional Area 4 Volume In Cc: 0.5
Additional Area 1 Location: lateral mouth, marionette lines, lateral cheeks.
Lot #: [id number]
Additional Area 3 Location: perioral fine lines, vermillion lips, NLFs. marionette lines upper lip lines
Map Statment: See [address] for Complete Details
Lot #: [id number]

## 2021-01-07 NOTE — PROCEDURE: IN-HOUSE DISPENSING PHARMACY
Product 7 Units Dispensed: 0
Product 69 Unit Type: mg
Product 6 Price/Unit (In Dollars): 1
Product 2 Application Directions: Take one tablet prior to procedure as indicated.
Name Of Product 5: Prednisone 20mg
Product 11 Application Directions: Apply to the face once at night.
Name Of Product 17: Misbah Naqvi
Send Charges To Patient Encounter: No
Product 7 Application Directions: Apply to each eye lashes at bedtime
Product 21 Unit Type: kit(s)
Product 19 Application Directions: Tretinoin 0.05%
Name Of Product 15: Viviscal PRO
Product 2 Unit Type: tablets
Product 1 Amount/Unit (Numbers Only): 3
Product 11 Unit Type: jar(s)
Name Of Product 3: Prednisone 10 mg
Product 5 Application Directions: Take one tablet by mouth x 3 days as indicated for swelling
Product 9 Application Directions: Apply to affected area face am and pm only as indicated.
Product 6 Amount/Unit (Numbers Only): 21
Product 7 Unit Type: bottle(s)
Name Of Product 21: Defenage skincare
Product 6 Unit Type: grams
Name Of Product 2: Valium 5 mg
Product 4 Application Directions: Take 1 tablet today in office post injections  for swelling as indicated and one tablet tomorrow as needed for swelling.
Name Of Product 11: Hydroquinone 6% pads
Name Of Product 7: Latisse 5ml
Product 14 Application Directions: Take one tablet in the Am and Pm x 3 days
Product 13 Application Directions: Wash the face in the AM and PM
Product 12 Refills: 2
Product 1 Price/Unit (In Dollars): 0.00
Product 15 Amount/Unit (Numbers Only): [facility]
Detail Level: Detailed
Product 21 Application Directions: Use as directed
Name Of Product 4: Prednisone 10mg
Product 1 Application Directions: Apply to face at bedtime
Name Of Product 9: Brightening pads 10%
Product 10 Application Directions: Apply to face small amount at bedtime only as indicated
Product 6 Application Directions: Apply pea size amount to entire face at bedtime only.
Name Of Product 14: Valtrex 500mg
Name Of Product 13: Exfoliate Glycolic Cleanser
Product 1 Unit Type: ml
Product 19 Unit Type: tube(s)
Product 17 Application Directions: Apply thin layer to face at bedtime only.
Name Of Product 12: Skin Better Even tone Serum
Name Of Product 20: Apply pea size amount to entire face at bedtime only at bedtime only.
Product 2 Price/Unit (In Dollars): 1/2
Product 3 Application Directions: Take 1 tablet For swelling as indicated. Dispense in office.
Product 15 Application Directions: Take one tablet twice daily for 3-6 months
Name Of Product 6: Tretinoin 0.05% cream
Product 8 Application Directions: Take one tablet today one hour prior to procedure as indicated.
Name Of Product 10: Annie Jordy
Product 14 Amount/Unit (Numbers Only): [address]
Product 12 Application Directions: Apply to the entire face in the Am and Pm

## 2021-01-07 NOTE — PROCEDURE: PICOWAY LASER
Frequency (Hz): Meghana Anderson
Fluence (J/Cm2): 1.9
Total Pulses: [address] [facility]
Total Pulses: 1 pass
Post-Care Instructions: I reviewed with the patient in detail post-care instructions. Patient should avoid sun for a minimum of 4 weeks before and after treatment.
Wavelength: 1064 nm
Treatment Number: 0
Frequency (Hz): Josselyn Lathe
Spot Size: 8.0 mm
Handpiece: Resolve
Detail Level: Detailed
Wavelength: 532 nm
Fluence (J/Cm2): 0.4
Hide Pulse Duration?: No
Post-Procedure Care: Immediate endpoint: perifollicular erythema and edema. Vaseline and ice applied. Post care reviewed with patient.
Handpiece: n/a nm
Spot Size: 6.0 mm
Anesthesia Type: 1% lidocaine with epinephrine
Frequency (Hz): Kitty Comer
Topical Anesthesia Type: 20% benzocaine, 8% lidocaine, 4% tetracaine
Pre-Procedure: Prior to proceeding the treatment areas were cleaned and all present put on their eye protection.
Pulse Duration: 2.5 ms
Handpiece: Zoom
Location Override: upper lip.
Fluence (J/Cm2): 0. 75
Consent: Written consent obtained, risks reviewed including but not limited to crusting, scabbing, blistering, scarring, darker or lighter pigmentary change, paradoxical hair regrowth, incomplete removal of hair and infection.

## 2021-01-08 ENCOUNTER — APPOINTMENT (RX ONLY)
Dept: URBAN - METROPOLITAN AREA CLINIC 23 | Facility: CLINIC | Age: 31
Setting detail: DERMATOLOGY
End: 2021-01-08

## 2021-01-08 VITALS — TEMPERATURE: 97.8 F

## 2021-01-08 DIAGNOSIS — Z41.9 ENCOUNTER FOR PROCEDURE FOR PURPOSES OTHER THAN REMEDYING HEALTH STATE, UNSPECIFIED: ICD-10-CM

## 2021-01-08 PROCEDURE — ? IN-HOUSE DISPENSING PHARMACY

## 2021-01-08 PROCEDURE — ? ADDITIONAL NOTES

## 2021-01-08 PROCEDURE — ? PULSED-DYE LASER

## 2021-01-08 NOTE — PROCEDURE: IN-HOUSE DISPENSING PHARMACY
Product 71 Unit Type: mg
Product 10 Units Dispensed: 0
Product 7 Application Directions: Apply to each eye lashes at bedtime
Product 4 Unit Type: tablets
Product 6 Price/Unit (In Dollars): 1
Name Of Product 20: Apply pea size amount to entire face at bedtime only at bedtime only.
Product 10 Application Directions: Apply to face small amount at bedtime only as indicated
Product 17 Application Directions: Apply thin layer to face at bedtime only.
Product 1 Price/Unit (In Dollars): 0.00
Name Of Product 5: Prednisone 20mg
Product 7 Unit Type: bottle(s)
Product 14 Application Directions: Take one tablet in the Am and Pm x 3 days
Product 13 Application Directions: Wash the face in the AM and PM
Product 2 Application Directions: Take one tablet prior to procedure as indicated.
Name Of Product 15: Viviscal PRO
Name Of Product 8: Valium 5 mg
Product 6 Amount/Unit (Numbers Only): [facility]
Product 12 Refills: 2
Detail Level: Detailed
Product 6 Unit Type: grams
Product 12 Application Directions: Apply to the entire face in the Am and Pm
Product 9 Unit Type: jar(s)
Name Of Product 7: Latisse 5ml
Product 1 Unit Type: ml
Product 19 Application Directions: Tretinoin 0.05%
Name Of Product 17: Basil Hemp
Name Of Product 10: Jac Uribe
Product 15 Amount/Unit (Numbers Only): [address] [facility]
Product 4 Application Directions: Take 1 tablet today in office post injections  for swelling as indicated and one tablet tomorrow as needed for swelling.
Name Of Product 13: Exfoliate Glycolic Cleanser
Product 19 Unit Type: tube(s)
Product 3 Application Directions: Take 1 tablet For swelling as indicated. Dispense in office.
Product 2 Price/Unit (In Dollars): 1/2
Name Of Product 1: Brightening pads 10%
Name Of Product 12: Skin Better Even tone Serum
Product 21 Application Directions: Use as directed
Product 21 Unit Type: kit(s)
Product 6 Application Directions: Apply pea size amount to entire face at bedtime only.
Name Of Product 4: Prednisone 10mg
Product 14 Amount/Unit (Numbers Only): [address]
Product 9 Application Directions: Apply to affected area face am and pm only as indicated.
Product 1 Application Directions: Apply to face at bedtime
Name Of Product 11: Hydroquinone 6% pads
Product 5 Application Directions: Take one tablet by mouth x 3 days as indicated for swelling
Product 1 Amount/Unit (Numbers Only): 3
Name Of Product 14: Valtrex 500mg
Name Of Product 3: Prednisone 10 mg
Send Charges To Patient Encounter: No
Product 15 Application Directions: Take one tablet twice daily for 3-6 months
Product 8 Application Directions: Take one tablet today one hour prior to procedure as indicated.
Product 11 Application Directions: Apply to the face once at night.
Name Of Product 21: Defenage skincare
Name Of Product 6: Tretinoin 0.05% cream

## 2021-01-08 NOTE — PROCEDURE: ADDITIONAL NOTES
Detail Level: Detailed
Additional Notes: Kysse lip (lot #[id number] exp 04/30/22 \\nTouch up on bottom lip
Render Risk Assessment In Note?: no

## 2021-01-08 NOTE — PROCEDURE: PULSED-DYE LASER
Fluence In J/Cm2 (Optional): 6.50
Laser Type: Vbeam 595nm
Delay Time (Ms): [address]
Cryogen Time (Ms): [address] Renato Yanez
Delay Time (Ms): 88[address] [facility]
Spot Size: 10x3 mm
Pulse Duration: 10 ms
Treated Area: medium area
Immediate Endpoint: purpura
Consent: Written consent obtained, risks reviewed including but not limited to crusting, scabbing, blistering, scarring, darker or lighter pigmentary change, incidental hair removal, bruising, and/or incomplete removal.
Cryogen Time (Ms): 30
Spot Size: 10 mm
Detail Level: Detailed
Delay Time (Ms): 10
Location Override: post injections
Immediate Endpoint: erythema
Fluence In J/Cm2 (Optional): 12.00
Spot Size: 7 mm
Post-Care Instructions: I reviewed with the patient in detail post-care instructions. Patient should stay away from the sun and wear sun protection until treated areas are fully healed.
Delay Time (Ms): 20
Treated Area: small area

## 2021-01-19 ENCOUNTER — APPOINTMENT (RX ONLY)
Dept: URBAN - METROPOLITAN AREA CLINIC 23 | Facility: CLINIC | Age: 31
Setting detail: DERMATOLOGY
End: 2021-01-19

## 2021-01-19 VITALS — TEMPERATURE: 97.7 F

## 2021-01-19 DIAGNOSIS — Z41.9 ENCOUNTER FOR PROCEDURE FOR PURPOSES OTHER THAN REMEDYING HEALTH STATE, UNSPECIFIED: ICD-10-CM

## 2021-01-19 PROCEDURE — ? FILLERS

## 2021-01-19 PROCEDURE — ? BOTOX

## 2021-01-19 PROCEDURE — ? PULSED-DYE LASER

## 2021-01-19 NOTE — PROCEDURE: PULSED-DYE LASER
Immediate Endpoint: erythema
Post-Care Instructions: I reviewed with the patient in detail post-care instructions. Patient should stay away from the sun and wear sun protection until treated areas are fully healed.
Cryogen Time (Ms): 27
Fluence In J/Cm2 (Optional): 6.50
Delay Time (Ms): 8254 [facility]
Post-Procedure Care: Post cosmetic injections
Pulse Duration: 6 ms
Immediate Endpoint: purpura
Location Override: face post cosmetic injections
Spot Size: 10 mm
Cryogen Time (Ms): [address]2512 Renato Yanez
Delay Time (Ms): 20
Pulse Duration: 10 ms
Cryogen Time (Ms): 30
Laser Type: Vbeam 595nm
Consent: Written consent obtained, risks reviewed including but not limited to crusting, scabbing, blistering, scarring, darker or lighter pigmentary change, incidental hair removal, bruising, and/or incomplete removal.
Treated Area: medium area
Spot Size: 7 mm
Delay Time (Ms): 10
Treated Area: large area
Detail Level: Detailed
Delay Time (Ms): [address]

## 2021-01-19 NOTE — PROCEDURE: BOTOX
Show Additional Area 1: Yes
Additional Area 6 Location: chin
Additional Area 3 Location: right lateral brow
Right Pupillary Line Units: 0
Show Ucl Units: No
Consent: Written consent obtained. Risks include but not limited to lid/brow ptosis, bruising, swelling, diplopia, temporary effect, incomplete chemical denervation.
Lot #: [id number]
Additional Area 4 Location: lateral eyes
Additional Area 1 Location: forehead 2 cm above brows
Detail Level: Detailed
Post-Care Instructions: Patient instructed to not lie down for 4 hours and limit physical activity for 24 hours. Patient instructed not to travel by airplane for 48 hours.
Dilution (U/0.1 Cc): 1.2
Additional Area 5 Location: upper lip
Additional Area 2 Location: lateral eyes ( touch up)
Expiration Date (Month Year): 06/23
Additional Area 2 Units: 2

## 2021-01-19 NOTE — PROCEDURE: FILLERS
Expiration Date (Month Year): 11/2022
Additional Area 5 Location: vermillion lips and lips
Additional Area 3 Location: perioral fine lines, vermillion lips, NLFs. marionette lines upper lip lines
Decollete Filler  Volume In Cc: 0
Include Cannula Information In Note?: No
Anesthesia Type: 1% lidocaine with epinephrine
Lot #: [id number]
Price (Use Numbers Only, No Special Characters Or $): 0.00
Additional Area 4 Location: era lobes, jawline
Include Cannula Brand?: DermaSculpt
Lot #: [id number]
Additional Area 5 Location: cheeks (cannula used
Anesthesia Volume In Cc: 0.3
Consent: Written consent obtained. Risks include but not limited to bruising, beading, irregular texture, ulceration, infection, allergic reaction, scar formation, incomplete augmentation, temporary nature, procedural pain.
Additional Area 1 Location: lateral mouth, marionette lines, lateral cheeks.
Expiration Date (Month Year): 2022-04-30
Expiration Date (Month Year): 05/19/2020
Post-Care Instructions: Patient instructed to apply ice to reduce swelling.
Include Cannula Size?: 25G
Lot #: [id number]
Include Cannula Length?: 1.5 inch
Map Statment: See [address] for Complete Details
Additional Anesthesia Volume In Cc: 6
Expiration Date (Month Year): 2023-01-31
Additional Area 1 Location: using the cannula to medial cheeks. using conventional needle to marionette lines.
Additional Area 1 Location: chin
Additional Area 2 Location: lateral cheeks,oral commesure,chin and upper lip lines
Filler: Restylane Kysse
Additional Area 5 Location: oral commissures, upper lip lines, vermillion lips
Additional Area 3 Location: marionette and oral commissures, vermillion border
Lot #: [id number]
Additional Area 4 Location: lips (Touch up )
Additional Area 2 Location: oral commissures ,marionettes lines ,perioral fine lines, NLFâs.
Filler Comments: Physician sample
Detail Level: Detailed
Additional Area 4 Volume In Cc: 0.2

## 2021-02-19 ENCOUNTER — APPOINTMENT (RX ONLY)
Dept: URBAN - METROPOLITAN AREA CLINIC 23 | Facility: CLINIC | Age: 31
Setting detail: DERMATOLOGY
End: 2021-02-19

## 2021-02-19 DIAGNOSIS — Z41.9 ENCOUNTER FOR PROCEDURE FOR PURPOSES OTHER THAN REMEDYING HEALTH STATE, UNSPECIFIED: ICD-10-CM

## 2021-02-19 PROCEDURE — ? DIAGNOSIS COMMENT

## 2021-02-19 PROCEDURE — ? FILLERS

## 2021-02-19 NOTE — PROCEDURE: FILLERS
Additional Area 4 Volume In Cc: 0
Additional Area 4 Location: era lobes, jawline
Expiration Date (Month Year): 2022/31/05
Additional Area 5 Location: oral commissures, upper lip lines, vermillion lips
Anesthesia Volume In Cc: 0.3
Include Cannula Information In Note?: No
Additional Area 5 Location: cheeks (cannula used
Lot #: [id number]
Lot #: [id number]
Include Cannula Brand?: DermaSculpt
Additional Anesthesia Volume In Cc: 6
Expiration Date (Month Year): 2021-12
Expiration Date (Month Year): 03/03/21
Additional Area 1 Location: bottom lip
Detail Level: Detailed
Include Cannula Information In Note?: Yes
Additional Area 2 Location: using conventional needle to lateral cheeks.
Filler: Restylane Defyne
Include Cannula Length?: 1.5 inch
Lot #: [id number]
Additional Area 1 Location: using the cannula to jawline and chin.
Additional Area 1 Location: vermilion lip line and upper lip fine lines
Price (Use Numbers Only, No Special Characters Or $): 0.00
Additional Area 3 Location: fine lines upper lip, marionette lines, jawline
Expiration Date (Month Year): 07/20/21
Include Cannula Size?: 25G
Additional Area 2 Location: oral commissures ,marionettes lines ,perioral fine lines, NLFâs.
Additional Area 4 Location: NLFs , cheeks, jawline
Additional Area 3 Location: perioral fine lines, vermillion lips, NLFs. marionette lines upper lip lines
Additional Area 5 Location: vermillion lips and lips
Map Statment: See [address] for Complete Details
Additional Area 1 Location: chin lines, marionette lines (physician sample)
Consent: Written consent obtained. Risks include but not limited to bruising, beading, irregular texture, ulceration, infection, allergic reaction, scar formation, incomplete augmentation, temporary nature, procedural pain.
Post-Care Instructions: Patient instructed to apply ice to reduce swelling.
Lot #: [id number]
Anesthesia Type: 1% lidocaine with epinephrine

## 2021-04-06 ENCOUNTER — APPOINTMENT (RX ONLY)
Dept: URBAN - METROPOLITAN AREA CLINIC 23 | Facility: CLINIC | Age: 31
Setting detail: DERMATOLOGY
End: 2021-04-06

## 2021-04-06 VITALS — TEMPERATURE: 97.7 F

## 2021-04-06 DIAGNOSIS — Z41.9 ENCOUNTER FOR PROCEDURE FOR PURPOSES OTHER THAN REMEDYING HEALTH STATE, UNSPECIFIED: ICD-10-CM

## 2021-04-06 PROCEDURE — ? HYALURONIDASE INJECTION

## 2021-04-06 PROCEDURE — ? PULSED-DYE LASER

## 2021-04-06 PROCEDURE — ? PICOWAY LASER

## 2021-04-06 ASSESSMENT — LOCATION ZONE DERM: LOCATION ZONE: LIP

## 2021-04-06 ASSESSMENT — LOCATION DETAILED DESCRIPTION DERM: LOCATION DETAILED: RIGHT INFERIOR VERMILION LIP

## 2021-04-06 ASSESSMENT — LOCATION SIMPLE DESCRIPTION DERM: LOCATION SIMPLE: RIGHT LIP

## 2021-04-06 NOTE — PROCEDURE: HYALURONIDASE INJECTION
Detail Level: Detailed
Lot # (Optional): [id number]
Expiration Date (Optional): 04/22
Administered By (Optional): Dr. Tasha Meza
Consent: The risks of contour defects and dimpling of the skin were reviewed with the patient prior to the injection.
Hyaluronidase Preparation: hyaluronidase 150 USP units/ml
Filler Previously Used (Optional): filler
Total Volume (Ccs): 0.3

## 2021-04-06 NOTE — PROCEDURE: PULSED-DYE LASER
Spot Size: 7 mm
Fluence In J/Cm2 (Optional): 8.00
Immediate Endpoint: erythema
Location Override: spot
Detail Level: Simple
Treated Area: small area
Spot Size: 10 mm
Pulse Duration: 10 ms
Delay Time (Ms): [address]
Cryogen Time (Ms): [address] Renato Yanez
Cryogen Time (Ms): 30
Post-Procedure Care: Post cosmetic injections
Fluence In J/Cm2 (Optional): 6.5
Pulse Duration: 1.5 ms
Consent: Written consent obtained, risks reviewed including but not limited to crusting, scabbing, blistering, scarring, darker or lighter pigmentary change, incidental hair removal, bruising, and/or incomplete removal.
Immediate Endpoint: purpura
Laser Type: Vbeam 595nm
Fluence In J/Cm2 (Optional): 6.50
Delay Time (Ms): 3872 [facility]
Post-Care Instructions: I reviewed with the patient in detail post-care instructions. Patient should stay away from the sun and wear sun protection until treated areas are fully healed.
Delay Time (Ms): 20
Delay Time (Ms): 10

## 2021-04-06 NOTE — PROCEDURE: PICOWAY LASER
Consent: Written consent obtained, risks reviewed including but not limited to crusting, scabbing, blistering, scarring, darker or lighter pigmentary change, paradoxical hair regrowth, incomplete removal of hair and infection.
Fluence (J/Cm2): 1.7
Treatment Number: 0
Wavelength: 532 nm
Spot Size: 6.5 mm
Frequency (Hz): 3Hz
Spot Size: 8.0 mm
Total Pulses: 4 HZ
Topical Anesthesia Type: 20% benzocaine, 8% lidocaine, 4% tetracaine
Spot Size: 6.0 mm
Total Pulses: 5 HZ
Post-Care Instructions: I reviewed with the patient in detail post-care instructions. Patient should avoid sun for a minimum of 4 weeks before and after treatment.
Handpiece: Resolve
Hide Pulse Duration?: No
Anesthesia Type: 1% lidocaine with epinephrine
Fluence (J/Cm2): 0.4
Fluence (J/Cm2): 0. 75
Handpiece: Zoom
Location Override: melasma
Pre-Procedure: Prior to proceeding the treatment areas were cleaned and all present put on their eye protection.
Wavelength: 1064 nm
Post-Procedure Care: Immediate endpoint: perifollicular erythema and edema. Vaseline and ice applied. Post care reviewed with patient.
Fluence (J/Cm2): 1.9
Detail Level: Detailed

## 2021-05-06 ENCOUNTER — APPOINTMENT (RX ONLY)
Dept: URBAN - METROPOLITAN AREA CLINIC 23 | Facility: CLINIC | Age: 31
Setting detail: DERMATOLOGY
End: 2021-05-06

## 2021-05-06 DIAGNOSIS — Z41.9 ENCOUNTER FOR PROCEDURE FOR PURPOSES OTHER THAN REMEDYING HEALTH STATE, UNSPECIFIED: ICD-10-CM

## 2021-05-06 DIAGNOSIS — L85.3 XEROSIS CUTIS: ICD-10-CM

## 2021-05-06 DIAGNOSIS — D485 NEOPLASM OF UNCERTAIN BEHAVIOR OF SKIN: ICD-10-CM

## 2021-05-06 DIAGNOSIS — L81.1 CHLOASMA: ICD-10-CM

## 2021-05-06 PROBLEM — D48.5 NEOPLASM OF UNCERTAIN BEHAVIOR OF SKIN: Status: ACTIVE | Noted: 2021-05-06

## 2021-05-06 PROCEDURE — 99213 OFFICE O/P EST LOW 20 MIN: CPT | Mod: 25

## 2021-05-06 PROCEDURE — ? HYALURONIDASE INJECTION

## 2021-05-06 PROCEDURE — ? BIOPSY BY SHAVE METHOD

## 2021-05-06 PROCEDURE — ? BOTOX

## 2021-05-06 PROCEDURE — 11102 TANGNTL BX SKIN SINGLE LES: CPT

## 2021-05-06 PROCEDURE — ? COUNSELING

## 2021-05-06 PROCEDURE — ? MICRONEEDLING

## 2021-05-06 ASSESSMENT — LOCATION SIMPLE DESCRIPTION DERM
LOCATION SIMPLE: LEFT LIP
LOCATION SIMPLE: LEFT PRETIBIAL REGION

## 2021-05-06 ASSESSMENT — LOCATION DETAILED DESCRIPTION DERM
LOCATION DETAILED: LEFT INFERIOR VERMILION LIP
LOCATION DETAILED: LEFT DISTAL PRETIBIAL REGION

## 2021-05-06 ASSESSMENT — LOCATION ZONE DERM
LOCATION ZONE: LIP
LOCATION ZONE: LEG

## 2021-05-06 NOTE — PROCEDURE: BIOPSY BY SHAVE METHOD
Detail Level: Detailed
Depth Of Biopsy: dermis
Was A Bandage Applied: Yes
Size Of Lesion In Cm: 0.2
X Size Of Lesion In Cm: 0
Biopsy Type: H and E
Biopsy Method: 15 blade
Anesthesia Type: 1% Xylocaine with [id number] epinephrine and sodium bicarbonate
Anesthesia Volume In Cc (Will Not Render If 0): 0.3
Hemostasis: Electrocautery
Wound Care: Vaseline
Dressing: bandage
Destruction After The Procedure: No
Type Of Destruction Used: Curettage
Curettage Text: The wound bed was treated with curettage after the biopsy was performed.
Cryotherapy Text: The wound bed was treated with cryotherapy after the biopsy was performed.
Electrodesiccation Text: The wound bed was treated with electrodesiccation after the biopsy was performed.
Electrodesiccation And Curettage Text: The wound bed was treated with electrodesiccation and curettage after the biopsy was performed.
Silver Nitrate Text: The wound bed was treated with silver nitrate after the biopsy was performed.
Lab: [id number] [facility] [address]
Consent: The provider's intent is to obtain a tissue sample solely for diagnostic purposes. Written consent to obtain tissue sample was obtained and risks were reviewed including but not limited to scarring, infection, bleeding, scabbing, incomplete removal, nerve damage and allergy to anesthesia.
Post-Care Instructions: I reviewed with the patient in detail post-care instructions. Patient is to keep the biopsy site dry overnight, and then apply bacitracin twice daily until healed. Patient may apply hydrogen peroxide soaks to remove any crusting.
Notification Instructions: Patient will be notified of biopsy results. However, patient instructed to call the office if not contacted within 2 weeks.
Billing Type: [employer]
Information: Selecting Yes will display possible errors in your note based on the variables you have selected. This validation is only offered as a suggestion for you. PLEASE NOTE THAT THE VALIDATION TEXT WILL BE REMOVED WHEN YOU FINALIZE YOUR NOTE. IF YOU WANT TO FAX A PRELIMINARY NOTE YOU WILL NEED TO TOGGLE THIS TO 'NO' IF YOU DO NOT WANT IT IN YOUR FAXED NOTE.

## 2021-05-06 NOTE — PROCEDURE: COUNSELING
Detail Level: Zone
Patient Specific Counseling (Will Not Stick From Patient To Patient): Continue Cyspera

## 2021-05-06 NOTE — PROCEDURE: MICRONEEDLING
Treatment Number (Optional): 1
Post-Care Instructions: After the procedure, take precautions agains sun exposure. Do not apply sunscreen for 12 hours after the procedure. Do not apply make-up for 12 hours after the procedure. Avoid alcohol based toners for 10-14 days. After 2-3 days patients can return to their regular skin regimen. \\nFace tip Lot # [id number]
Consent: Written consent obtained, risks reviewed including but not limited to pain, scarring, infection and incomplete improvement. Patient understands the procedure is cosmetic in nature and will require out of pocket payment.
Depth In Mm: 0.1
Location #1: full face
Detail Level: Detailed
Infusions (Optional): PRP
Depth In Mm: 0.8
Depth In Mm: 2

## 2021-05-06 NOTE — PROCEDURE: BOTOX
Show Additional Area 3: Yes
Levator Labii Superioris Units: 0
Additional Area 1 Location: high forehead
Show Right And Left Pupillary Line Units: No
Expiration Date (Month Year): 2023-08
Additional Area 4 Location: bunnies lines
Lot #: [id number]
Post-Care Instructions: Patient instructed to not lie down for 4 hours and limit physical activity for 24 hours. Patient instructed not to travel by airplane for 48 hours.
Glabellar Complex Units: 8
Detail Level: Detailed
Additional Area 1 Units: [address] [place] [address]
Additional Area 6 Location: chin
Additional Area 3 Location: glabella
Dilution (U/0.1 Cc): 2.5
Additional Area 4 Units: 4
Additional Area 5 Location: lip flip
Additional Area 2 Location: forehead (touch up) patient had no response to dysport
Consent: Written consent obtained. Risks include but not limited to lid/brow ptosis, bruising, swelling, diplopia, temporary effect, incomplete chemical denervation.

## 2021-05-06 NOTE — PROCEDURE: HYALURONIDASE INJECTION
Hyaluronidase Preparation: hyaluronidase 150 USP units/ml
Lot # (Optional): [id number]
Administered By (Optional): Dr. Grullon Sic
Consent: The risks of contour defects and dimpling of the skin were reviewed with the patient prior to the injection.
Filler Previously Used (Optional): filler
Expiration Date (Optional): 04/22
Total Volume (Ccs): 0.2
Detail Level: Simple

## 2021-06-03 ENCOUNTER — APPOINTMENT (RX ONLY)
Dept: URBAN - METROPOLITAN AREA CLINIC 23 | Facility: CLINIC | Age: 31
Setting detail: DERMATOLOGY
End: 2021-06-03

## 2021-06-03 DIAGNOSIS — Z41.9 ENCOUNTER FOR PROCEDURE FOR PURPOSES OTHER THAN REMEDYING HEALTH STATE, UNSPECIFIED: ICD-10-CM

## 2021-06-03 PROCEDURE — ? HYALURONIDASE INJECTION

## 2021-06-03 ASSESSMENT — LOCATION DETAILED DESCRIPTION DERM: LOCATION DETAILED: RIGHT INFERIOR VERMILION LIP

## 2021-06-03 ASSESSMENT — LOCATION ZONE DERM: LOCATION ZONE: LIP

## 2021-06-03 ASSESSMENT — LOCATION SIMPLE DESCRIPTION DERM: LOCATION SIMPLE: RIGHT LIP

## 2021-06-03 NOTE — PROCEDURE: HYALURONIDASE INJECTION
Lot # (Optional): [id number]
Filler Previously Used (Optional): filler
Total Volume (Ccs): 0.1
Consent: The risks of contour defects and dimpling of the skin were reviewed with the patient prior to the injection.
Hyaluronidase Preparation: hyaluronidase 150 USP units/ml
Expiration Date (Optional): 07/21
Administered By (Optional): Dr. Yulia Diaz
Detail Level: Simple

## 2021-06-09 ENCOUNTER — APPOINTMENT (RX ONLY)
Dept: URBAN - METROPOLITAN AREA CLINIC 23 | Facility: CLINIC | Age: 31
Setting detail: DERMATOLOGY
End: 2021-06-09

## 2021-06-09 VITALS — TEMPERATURE: 97.5 F

## 2021-06-09 DIAGNOSIS — Z41.9 ENCOUNTER FOR PROCEDURE FOR PURPOSES OTHER THAN REMEDYING HEALTH STATE, UNSPECIFIED: ICD-10-CM

## 2021-06-09 PROCEDURE — ? BOTOX

## 2021-06-09 PROCEDURE — ? FILLERS

## 2021-06-09 PROCEDURE — ? PULSED-DYE LASER

## 2021-06-09 PROCEDURE — ? QWO INJECTION

## 2021-06-09 NOTE — PROCEDURE: BOTOX
Additional Area 4 Units: 0
Additional Area 6 Location: axilla
Consent: Written consent obtained. Risks include but not limited to lid/brow ptosis, bruising, swelling, diplopia, temporary effect, incomplete chemical denervation.
Show Periorbital Units: Yes
Show Right And Left Brow Units: No
Additional Area 1 Units: 2
Dilution (U/0.1 Cc): 2.5
Additional Area 5 Location: lip flip
Additional Area 4 Location: bunnies lines
Additional Area 1 Location: chin
Post-Care Instructions: Patient instructed to not lie down for 4 hours and limit physical activity for 24 hours. Patient instructed not to travel by airplane for 48 hours.
Forehead Units: 4
Detail Level: Detailed
Lot #: [id number]
Additional Area 2 Location: high forehead 2 cm above brows
Expiration Date (Month Year): 2023-10

## 2021-06-09 NOTE — PROCEDURE: FILLERS
Mid Face Filler  Volume In Cc: 0
Filler: Restylane Defyne
Additional Area 3 Location: marionette,oral commesure and tear trough
Include Cannula Brand?: DermaSculpt
Additional Area 1 Location: jawline area, chin area.
Expiration Date (Month Year): 07/20/21
Price (Use Numbers Only, No Special Characters Or $): 0.00
Additional Area 4 Location: lips, upper lip lines marionette lines, oral commissures
Include Cannula Information In Note?: No
Include Cannula Size?: 25G
Additional Area 2 Location: chin and cheeks using an acanula
Include Cannula Length?: 1.5 inch
Additional Area 5 Location: left cheeks, marionette, jawline
Additional Area 3 Location: perioral fine lines, vermillion lips, NLFs. marionette lines upper lip lines
Additional Area 1 Location: lateral jawline and chin area
Anesthesia Type: 1% lidocaine with epinephrine
Additional Area 1 Location: chin lines, marionette lines (physician sample)
Lot #: [id number]
Additional Area 4 Location: chin, marionette lines
Map Statment: See [address] for Complete Details
Consent: Written consent obtained. Risks include but not limited to bruising, beading, irregular texture, ulceration, infection, allergic reaction, scar formation, incomplete augmentation, temporary nature, procedural pain.
Post-Care Instructions: Patient instructed to apply ice to reduce swelling.
Anesthesia Volume In Cc: 0.3
Expiration Date (Month Year): 2022-10
Additional Area 5 Location: cheeks (cannula used) tear throughs
Additional Area 5 Location: oral commissures, upper lip lines, vermillion lips
Lot #: [id number]
Additional Anesthesia Volume In Cc: 6
Lot #: [id number]
Additional Area 1 Location: lips and chin
Expiration Date (Month Year): 2022-08-31
Expiration Date (Month Year): 2022-10-31
Additional Area 1 Volume In Cc: 1
Include Cannula Information In Note?: Yes
Additional Area 2 Location: tear troughs (cannula used). Using conventional needle to cheeks.
Detail Level: Detailed
Lot #: [id number]

## 2021-06-09 NOTE — PROCEDURE: PULSED-DYE LASER
Cryogen Time (Ms): [address] Renato Yanez
Post-Care Instructions: I reviewed with the patient in detail post-care instructions. Patient should stay away from the sun and wear sun protection until treated areas are fully healed.
Post-Procedure Care: Post cosmetic injections
Treated Area: large area
Delay Time (Ms): [address]
Treated Area: medium area
Fluence In J/Cm2 (Optional): 6:50
Fluence In J/Cm2 (Optional): 11.00
Delay Time (Ms): 10
Fluence In J/Cm2 (Optional): 6.50
Delay Time (Ms): 9[address] [facility]
Location Override: right cheek
Delay Time (Ms): 20
Laser Type: Vbeam 595nm
Consent: Written consent obtained, risks reviewed including but not limited to crusting, scabbing, blistering, scarring, darker or lighter pigmentary change, incidental hair removal, bruising, and/or incomplete removal.
Immediate Endpoint: purpura
Spot Size: 10 mm
Pulse Duration: 6 ms
Spot Size: 10x3 mm
Immediate Endpoint: erythema
Pulse Duration: 10 ms
Spot Size: 7 mm
Cryogen Time (Ms): 30
Detail Level: Simple

## 2021-06-09 NOTE — PROCEDURE: QWO INJECTION
Detail Level: Detailed
Consent: Written consent was obtained. Risks were disclosed to include but not limited to bruising, swelling, numbness, beading, irregular texture or contour, ulceration, infection, allergic reaction, scar formation, damage to nerves, incomplete result, temporary nature, and procedural pain.
# Of Treatments In Package: 0
Qwo Volume In Ml (Won't Render If 0): 4
Lot #: [id number]
Expiration Date (Month Year): 09/22
Post-Care Instructions: Patient was instructed to not place pressure on the area for 5 minutes. Exercise and strenous activity are to be avoided for 48 hours.
Procedure Text: The treatment areas were cleansed. Qwo was administered by injecting 0.3ml in three equal aliquots in each dimple in the subcutaneous plane at 90 degrees once and 45 degrees twice using a 27 gauge needle.
Treatment Area: Buttocks
Treatment Number (Won't Render If 0): 1
Reconstitution Date: 06/21

## 2021-06-10 ENCOUNTER — APPOINTMENT (RX ONLY)
Dept: URBAN - METROPOLITAN AREA CLINIC 23 | Facility: CLINIC | Age: 31
Setting detail: DERMATOLOGY
End: 2021-06-10

## 2021-06-10 DIAGNOSIS — Z41.9 ENCOUNTER FOR PROCEDURE FOR PURPOSES OTHER THAN REMEDYING HEALTH STATE, UNSPECIFIED: ICD-10-CM

## 2021-06-10 PROCEDURE — ? PULSED-DYE LASER

## 2021-06-10 NOTE — PROCEDURE: PULSED-DYE LASER
Spot Size: 10 mm
Cryogen Time (Ms): 9[address] Renato Yanez
Pulse Duration: 10 ms
Post-Care Instructions: I reviewed with the patient in detail post-care instructions. Patient should stay away from the sun and wear sun protection until treated areas are fully healed.
Post-Procedure Care: Vaseline and ice applied. Post care reviewed with patient.
Delay Time (Ms): 2909 [facility]
Location Override: lower face and buttocks
Treated Area: small area
Laser Type: Vbeam 595nm
Cryogen Time (Ms): 30
Spot Size: 7 mm
Delay Time (Ms): 20
Fluence In J/Cm2 (Optional): 6.5 j/cm2
Pulse Duration: 6 ms
Location (Required For Details To Render In Note But Body Touch Will Also Count For First Location): lower face
Detail Level: Zone
Consent: Written consent obtained, risks reviewed including but not limited to crusting, scabbing, blistering, scarring, darker or lighter pigmentary change, incidental hair removal, bruising, and/or incomplete removal.

## 2021-06-15 ENCOUNTER — RX ONLY (OUTPATIENT)
Age: 31
Setting detail: RX ONLY
End: 2021-06-15

## 2021-06-15 RX ORDER — CLINDAMYCIN PHOSPHATE 10 MG/G
GEL TOPICAL
Qty: 1 | Refills: 1 | Status: ERX | COMMUNITY
Start: 2021-06-15

## 2021-07-01 ENCOUNTER — APPOINTMENT (RX ONLY)
Dept: URBAN - METROPOLITAN AREA CLINIC 23 | Facility: CLINIC | Age: 31
Setting detail: DERMATOLOGY
End: 2021-07-01

## 2021-07-01 DIAGNOSIS — Z41.9 ENCOUNTER FOR PROCEDURE FOR PURPOSES OTHER THAN REMEDYING HEALTH STATE, UNSPECIFIED: ICD-10-CM

## 2021-07-01 PROCEDURE — ? QWO INJECTION

## 2021-07-01 NOTE — PROCEDURE: QWO INJECTION
Detail Level: Detailed
Consent: Written consent was obtained. Risks were disclosed to include but not limited to bruising, swelling, numbness, beading, irregular texture or contour, ulceration, infection, allergic reaction, scar formation, damage to nerves, incomplete result, temporary nature, and procedural pain.
# Of Treatments In Package: 0
Qwo Volume In Ml (Won't Render If 0): 4
Lot #: [id number]
Expiration Date (Month Year): 06/22
Post-Care Instructions: Patient was instructed to not place pressure on the area for 5 minutes. Exercise and strenous activity are to be avoided for 48 hours.
Procedure Text: The treatment areas were cleansed. Qwo was administered by injecting 0.3ml in three equal aliquots in each dimple in the subcutaneous plane at 90 degrees once and 45 degrees twice using a 27 gauge needle.
Treatment Area: Buttocks
Treatment Number (Won't Render If 0): 2
Vials Used (Won't Render If 0 - Required If Using Inventory): 1
Reconstitution Date: 7/2021

## 2021-07-06 ENCOUNTER — APPOINTMENT (RX ONLY)
Dept: URBAN - METROPOLITAN AREA CLINIC 23 | Facility: CLINIC | Age: 31
Setting detail: DERMATOLOGY
End: 2021-07-06

## 2021-07-06 DIAGNOSIS — Z41.9 ENCOUNTER FOR PROCEDURE FOR PURPOSES OTHER THAN REMEDYING HEALTH STATE, UNSPECIFIED: ICD-10-CM

## 2021-07-06 PROCEDURE — ? PULSED-DYE LASER

## 2021-07-06 NOTE — PROCEDURE: PULSED-DYE LASER
Delay Time (Ms): [address]
Cryogen Time (Ms): [address] Renato Yanez
Delay Time (Ms): 4625 [facility]
Spot Size: 10x3 mm
Laser Type: Vbeam 595nm
Immediate Endpoint: purpura
Consent: Written consent obtained, risks reviewed including but not limited to crusting, scabbing, blistering, scarring, darker or lighter pigmentary change, incidental hair removal, bruising, and/or incomplete removal.
Treated Area: medium area
Pulse Duration: 10 ms
Cryogen Time (Ms): 30
Fluence In J/Cm2 (Optional): 6.50
Delay Time (Ms): 10
Pulse Duration: 6 ms
Location Override: post QWO injections
Immediate Endpoint: erythema
Detail Level: Simple
Post-Procedure Care: Post cosmetic injections
Post-Care Instructions: I reviewed with the patient in detail post-care instructions. Patient should stay away from the sun and wear sun protection until treated areas are fully healed.
Spot Size: 10 mm
Fluence In J/Cm2 (Optional): 11.00
Treated Area: small area
Spot Size: 7 mm
Delay Time (Ms): 20

## 2021-07-13 ENCOUNTER — RX ONLY (OUTPATIENT)
Age: 31
Setting detail: RX ONLY
End: 2021-07-13

## 2021-07-13 RX ORDER — SPIRONOLACTONE 100 MG/1
TABLET, FILM COATED ORAL
Qty: 60 | Refills: 3 | Status: ERX | COMMUNITY
Start: 2021-07-13

## 2021-07-22 ENCOUNTER — APPOINTMENT (RX ONLY)
Dept: URBAN - METROPOLITAN AREA CLINIC 23 | Facility: CLINIC | Age: 31
Setting detail: DERMATOLOGY
End: 2021-07-22

## 2021-07-22 DIAGNOSIS — Z41.9 ENCOUNTER FOR PROCEDURE FOR PURPOSES OTHER THAN REMEDYING HEALTH STATE, UNSPECIFIED: ICD-10-CM

## 2021-07-22 PROCEDURE — ? QWO INJECTION

## 2021-07-22 ASSESSMENT — LOCATION DETAILED DESCRIPTION DERM: LOCATION DETAILED: LEFT BUTTOCK

## 2021-07-22 ASSESSMENT — LOCATION SIMPLE DESCRIPTION DERM: LOCATION SIMPLE: LEFT BUTTOCK

## 2021-07-22 ASSESSMENT — LOCATION ZONE DERM: LOCATION ZONE: TRUNK

## 2021-07-22 NOTE — PROCEDURE: QWO INJECTION
# Of Treatments In Package: 0
Post-Care Instructions: Patient was instructed to not place pressure on the area for 5 minutes. Exercise and strenous activity are to be avoided for 48 hours.
Lot #: [id number]
Expiration Date (Month Year): 2022-06
Detail Level: Detailed
Treatment Area: Buttocks
Procedure Text: The treatment areas were cleansed. Qwo was administered by injecting 0.3ml in three equal aliquots in each dimple in the subcutaneous plane at 90 degrees once and 45 degrees twice using a 27 gauge needle.
Consent: Written consent was obtained. Risks were disclosed to include but not limited to bruising, swelling, numbness, beading, irregular texture or contour, ulceration, infection, allergic reaction, scar formation, damage to nerves, incomplete result, temporary nature, and procedural pain.
Treatment Number (Won't Render If 0): 3
Qwo Volume In Ml (Won't Render If 0): 4

## 2021-07-26 ENCOUNTER — APPOINTMENT (RX ONLY)
Dept: URBAN - METROPOLITAN AREA CLINIC 23 | Facility: CLINIC | Age: 31
Setting detail: DERMATOLOGY
End: 2021-07-26

## 2021-07-26 DIAGNOSIS — Z41.9 ENCOUNTER FOR PROCEDURE FOR PURPOSES OTHER THAN REMEDYING HEALTH STATE, UNSPECIFIED: ICD-10-CM

## 2021-07-26 PROCEDURE — ? COOLSCULPTING

## 2021-07-26 NOTE — PROCEDURE: COOLSCULPTING
Applicator Size: CoolAdvantage Curve
Suction Settings: The suction settings were per protocol.
Time (Minutes - Will Only Render If Nonzero): [address]
Time (Minutes - Will Only Render If Nonzero): 35
Device: Coolsculpting
Intro: Prior to treatment, the area was cleaned with alcohol and marked out with a marking pen. The gel sheet was then applied uniformly. The applicator was applied to the skin with good contact and suction.
Consent: Written consent obtained, risks reviewed including, but not limited to, blistering from suction, darker or lighter pigmentary change, bruising, and/or need for multiple treatments.
Location 1: outer thigh (right)
Detail Level: Zone
Post Treatment: After treatment, the suction was turned off, and the applicator was removed from the skin.

## 2021-08-24 ENCOUNTER — APPOINTMENT (RX ONLY)
Dept: URBAN - METROPOLITAN AREA CLINIC 23 | Facility: CLINIC | Age: 31
Setting detail: DERMATOLOGY
End: 2021-08-24

## 2021-08-24 DIAGNOSIS — Z41.9 ENCOUNTER FOR PROCEDURE FOR PURPOSES OTHER THAN REMEDYING HEALTH STATE, UNSPECIFIED: ICD-10-CM

## 2021-08-24 PROCEDURE — ? PICOWAY LASER

## 2021-08-24 PROCEDURE — ? BOTOX

## 2021-08-24 PROCEDURE — ? PULSED-DYE LASER

## 2021-08-24 NOTE — PROCEDURE: BOTOX
Show Orbicularis Oculi Units: Yes
L Brow Units: 0
Glabellar Complex Units: 12
Additional Area 1 Location: forehead and glabella - [place]
Show Right And Left Pupillary Line Units: No
Additional Area 3 Location: bunnies
Dilution (U/0.1 Cc): 2.5
Additional Area 6 Location: axilla
Forehead Units: [address]
Additional Area 2 Location: chin
Post-Care Instructions: Patient instructed to not lie down for 4 hours and limit physical activity for 24 hours. Patient instructed not to travel by airplane for 48 hours.
Additional Area 2 Units: 2
Lot #: [id number]
Additional Area 4 Location: lateral brows
Periorbital Skin Units: [address] [address]
Expiration Date (Month Year): 2023-11
Additional Area 3 Units: 4
Consent: Written consent obtained. Risks include but not limited to lid/brow ptosis, bruising, swelling, diplopia, temporary effect, incomplete chemical denervation.
Detail Level: Detailed
Additional Area 5 Location: lip flip

## 2021-08-24 NOTE — PROCEDURE: PICOWAY LASER
Wavelength: 532 nm
Detail Level: Detailed
Handpiece: Resolve
Treatment Number: 0
Fluence (J/Cm2): 2.1
Anesthesia Type: 1% lidocaine with epinephrine
Post-Procedure Care: Immediate endpoint: perifollicular erythema and edema. Vaseline and ice applied. Post care reviewed with patient.
Spot Size: 6.5 mm
Wavelength: 1064 nm
Hide Pulse Duration?: No
Fluence (J/Cm2): 0.4
Pre-Procedure: Prior to proceeding the treatment areas were cleaned and all present put on their eye protection.
Location Override: upper lip
Frequency (Hz): 6 HZ
Handpiece: Zoom
Fluence (J/Cm2): 0. 75
Post-Care Instructions: I reviewed with the patient in detail post-care instructions. Patient should avoid sun for a minimum of 4 weeks before and after treatment.
Total Pulses: 5 HZ
Frequency (Hz): 3Hz
Fluence (J/Cm2): 0.5
Consent: Written consent obtained, risks reviewed including but not limited to crusting, scabbing, blistering, scarring, darker or lighter pigmentary change, paradoxical hair regrowth, incomplete removal of hair and infection.
Topical Anesthesia Type: 20% benzocaine, 8% lidocaine, 4% tetracaine
Spot Size: 8.0 mm

## 2021-08-24 NOTE — PROCEDURE: PULSED-DYE LASER
Delay Time (Ms): [address]
Cryogen Time (Ms): [address] Renato Yanez
Location Override: nose ///N/C
Spot Size: 10x3 mm
Pulse Duration: 6 ms
Detail Level: Simple
Delay Time (Ms): 2682 [facility]
Treated Area: medium area
Immediate Endpoint: purpura
Post-Procedure Care: Post cosmetic injections
Spot Size: 10 mm
Delay Time (Ms): 10
Consent: Written consent obtained, risks reviewed including but not limited to crusting, scabbing, blistering, scarring, darker or lighter pigmentary change, incidental hair removal, bruising, and/or incomplete removal.
Cryogen Time (Ms): 30
Pulse Duration: 10 ms
Immediate Endpoint: erythema
Fluence In J/Cm2 (Optional): 10.0
Laser Type: Vbeam 595nm
Spot Size: 7 mm
Pulse Duration: 40 ms
Post-Care Instructions: I reviewed with the patient in detail post-care instructions. Patient should stay away from the sun and wear sun protection until treated areas are fully healed.
Delay Time (Ms): 20
Treated Area: small area
Fluence In J/Cm2 (Optional): 6.50

## 2022-01-25 ENCOUNTER — APPOINTMENT (RX ONLY)
Dept: URBAN - METROPOLITAN AREA CLINIC 23 | Facility: CLINIC | Age: 32
Setting detail: DERMATOLOGY
End: 2022-01-25

## 2022-01-25 DIAGNOSIS — Z41.9 ENCOUNTER FOR PROCEDURE FOR PURPOSES OTHER THAN REMEDYING HEALTH STATE, UNSPECIFIED: ICD-10-CM

## 2022-01-25 PROCEDURE — ? FILLERS

## 2022-01-25 PROCEDURE — ? BOTOX

## 2022-01-25 PROCEDURE — ? PULSED-DYE LASER

## 2022-01-25 NOTE — PROCEDURE: BOTOX
Additional Area 6 Location: neck bands
Periorbital Skin Units: 24
Show Right And Left Periorbital Units: No
Additional Area 4 Units: 0
Show Additional Area 6: Yes
Additional Area 2 Location: glabella
Additional Area 5 Location: bunnies lines
Consent: Written consent obtained. Risks include but not limited to lid/brow ptosis, bruising, swelling, diplopia, temporary effect, incomplete chemical denervation.
Expiration Date (Month Year): 04/24
Additional Area 1 Location: bunny lines
Additional Area 4 Location: lateral eyes
Dilution (U/0.1 Cc): 2.5
Additional Area 3 Location: lateral brows
Post-Care Instructions: Patient instructed to not lie down for 4 hours and limit physical activity for 24 hours. Patient instructed not to travel by airplane for 48 hours.
Detail Level: Detailed
Lot #: [id number]

## 2022-01-25 NOTE — PROCEDURE: PULSED-DYE LASER
Delay Time (Ms): 29[address] [facility]
Fluence In J/Cm2 (Optional): 6.50
Fluence In J/Cm2 (Optional): 11.00
Immediate Endpoint: erythema
Spot Size: 10 mm
Pulse Duration: 6 ms
Pulse Duration: 10 ms
Consent: Written consent obtained, risks reviewed including but not limited to crusting, scabbing, blistering, scarring, darker or lighter pigmentary change, incidental hair removal, bruising, and/or incomplete removal.
Cryogen Time (Ms): [address] Renato Yanez
Spot Size Override: 3x10
Treated Area: small area
Location Override: post cosmetic face
Cryogen Time (Ms): 30
Delay Time (Ms): 20
Delay Time (Ms): [address]
Spot Size: 3 mm
Post-Care Instructions: I reviewed with the patient in detail post-care instructions. Patient should stay away from the sun and wear sun protection until treated areas are fully healed.
Laser Type: Vbeam 595nm
Detail Level: Zone
Delay Time (Ms): 10

## 2022-01-25 NOTE — PROCEDURE: FILLERS
Nasolabial Folds Filler Volume In Cc: 0
Consent: Written consent obtained. Risks include but not limited to bruising, beading, irregular texture, ulceration, infection, allergic reaction, scar formation, incomplete augmentation, temporary nature, procedural pain.
Expiration Date (Month Year): 2024-05-31
Anesthesia Type: 1% lidocaine without epinephrine
Additional Area 1 Location: lateral mouth, marionette lines, glabella fine lines
Post-Care Instructions: Patient instructed to apply ice to reduce swelling.
Include Cannula Information In Note?: No
Lot #: [id number]
Anesthesia Volume In Cc: 0.2
Additional Area 2 Location: lip, marionette lines, oral commissures,
Lot #: [id number]
Include Cannula Brand?: DermaSculpt
Include Cannula Length?: 1.5 inch
Expiration Date (Month Year): 07/20/21
Expiration Date (Month Year): 2022-11
Additional Area 1 Location: chin
Additional Area 3 Location: perioral fine lines, vermillion lips, NLFs. marionette lines upper lip lines
Include Cannula Size?: 25G
Additional Area 1 Location: lateral cheeks, tear trough,oral commesure marionette lines.
Additional Area 2 Location: left dorsal hand
Additional Area 4 Location: chin, lateral cheeks, NLF?s, marionette lines
Detail Level: Zone
Include Cannula Length?: 1 inch
Additional Area 1 Location: chin lines, marionette lines (physician sample)
Additional Area 5 Location: [facility], marionette lines
Additional Area 2 Location: right lat cheek and oral commesure, physician sample
Filler: Restylane Kysse
Lot #: [id number]
Additional Area 3 Location: vermilion lips, nlfs and cheeks
Additional Area 5 Location: oral commissures, upper lip lines, vermillion lips
Additional Area 4 Location: jawline
Expiration Date (Month Year): 205-32-92
Lot #: [id number]
Map Statment: See [address] for Complete Details

## 2022-02-07 ENCOUNTER — APPOINTMENT (RX ONLY)
Dept: URBAN - METROPOLITAN AREA CLINIC 23 | Facility: CLINIC | Age: 32
Setting detail: DERMATOLOGY
End: 2022-02-07

## 2022-02-07 DIAGNOSIS — Z41.9 ENCOUNTER FOR PROCEDURE FOR PURPOSES OTHER THAN REMEDYING HEALTH STATE, UNSPECIFIED: ICD-10-CM

## 2022-02-07 PROCEDURE — ? BOTOX

## 2022-02-07 PROCEDURE — ? FILLERS

## 2022-02-07 PROCEDURE — ? HYALURONIDASE INJECTION

## 2022-02-07 ASSESSMENT — LOCATION SIMPLE DESCRIPTION DERM: LOCATION SIMPLE: LEFT LIP

## 2022-02-07 ASSESSMENT — LOCATION ZONE DERM: LOCATION ZONE: LIP

## 2022-02-07 ASSESSMENT — LOCATION DETAILED DESCRIPTION DERM: LOCATION DETAILED: LEFT SUPERIOR VERMILION LIP

## 2022-02-07 NOTE — PROCEDURE: FILLERS
Anesthesia Type: 1% lidocaine without epinephrine
Additional Area 1 Location: left lat cheek
Map Statment: See [address] for Complete Details
Additional Area 1 Volume In Cc: 0
Expiration Date (Month Year): 2023-06
Lot #: [id number]
Include Cannula Information In Note?: No
Additional Area 2 Location: medial cheeks, lateral cheeks
Anesthesia Volume In Cc: 0.2
Additional Area 3 Location: perioral fine lines, vermillion lips, NLFs. marionette lines upper lip lines
Include Cannula Brand?: DermaSculpt
Expiration Date (Month Year): 2022-11-30
Include Cannula Length?: 1.5 inch
Additional Area 1 Location: left cheek
Consent: Written consent obtained. Risks include but not limited to bruising, beading, irregular texture, ulceration, infection, allergic reaction, scar formation, incomplete augmentation, temporary nature, procedural pain.
Additional Area 4 Location: chin, lateral cheeks, NLF?s, marionette lines
Include Cannula Size?: 25G
Lot #: [id number]
Additional Area 2 Location: left dorsal hand
Post-Care Instructions: Patient instructed to apply ice to reduce swelling.
Additional Area 5 Location: [facility], marionette lines
Additional Area 1 Location: with conventional needle to perioral fine lines, lateral mouth. Using the cannula to fine lines upper lat brows area.
Expiration Date (Month Year): 07/20/21
Include Cannula Length?: 1 inch
Detail Level: Zone
Additional Area 2 Location: oral commesure
Lot #: [id number]
Filler: Restylane Kysse
Additional Area 1 Location: chin lines, marionette lines (physician sample)
Additional Area 3 Location: cheeks, jawline, upper lip
Additional Area 5 Location: oral commissures, upper lip lines, vermillion lips
Include Cannula Information In Note?: Yes
Expiration Date (Month Year): 2022-10-15
Additional Area 4 Location: jawline
Lot #: [id number]
Vermilion Lips Filler Volume In Cc: 1

## 2022-02-07 NOTE — PROCEDURE: HYALURONIDASE INJECTION
Administered By (Optional): Dr. Merlin Farris
Detail Level: Detailed
Filler Previously Used (Optional): filler
Lot # (Optional): [id number]
Hyaluronidase Preparation: hyaluronidase 150 USP units/ml
Consent: The risks of contour defects and dimpling of the skin were reviewed with the patient prior to the injection.
Total Volume (Ccs): 1
Expiration Date (Optional): 05/2024

## 2022-02-07 NOTE — PROCEDURE: BOTOX
Expiration Date (Month Year): 2024/04
Show Right And Left Periorbital Units: No
Additional Area 3 Units: 0
Show Anterior Platysmal Band Units: Yes
Additional Area 1 Location: 4
Consent: Written consent obtained. Risks include but not limited to lid/brow ptosis, bruising, swelling, diplopia, temporary effect, incomplete chemical denervation.
Additional Area 5 Location: crows feet
Additional Area 4 Location: lateral eyes
Comments: Forehead touch up 8 units no charge
Detail Level: Detailed
Post-Care Instructions: Patient instructed to not lie down for 4 hours and limit physical activity for 24 hours. Patient instructed not to travel by airplane for 48 hours.
Dilution (U/0.1 Cc): 2.5
Additional Area 3 Location: lateral brows
Additional Area 2 Location: high forehead 2 cm above brows
Additional Area 6 Location: left gummy
Lot #: [id number]

## 2022-04-01 ENCOUNTER — APPOINTMENT (RX ONLY)
Dept: URBAN - METROPOLITAN AREA CLINIC 23 | Facility: CLINIC | Age: 32
Setting detail: DERMATOLOGY
End: 2022-04-01

## 2022-04-01 DIAGNOSIS — Z41.9 ENCOUNTER FOR PROCEDURE FOR PURPOSES OTHER THAN REMEDYING HEALTH STATE, UNSPECIFIED: ICD-10-CM

## 2022-04-01 PROCEDURE — ? MICRONEEDLING

## 2022-04-01 PROCEDURE — ? DYSPORT

## 2022-04-01 PROCEDURE — ? QWO INJECTION

## 2022-04-01 NOTE — PROCEDURE: DYSPORT
Show Additional Area 3: Yes
Additional Area 1 Units: 0
Dilution (U/ 0.1cc): 1.5
Additional Area 4 Location: lateral eyes
Show Mentalis Units: No
Post-Care Instructions: Patient instructed to not lie down for 4 hours, limit physical activity for 24 hours and avoid manipulation of the treated areas.
Additional Area 2 Location: bunny line
Additional Area 5 Location: chin
Expiration Date (Month Year): 08/31/2022
Additional Area 3 Location: lateral brows
Additional Area 2 Units: 8
Detail Level: Zone
Additional Area 6 Location: [facility]
Consent: Written consent obtained. Risks include but not limited to lid/brow ptosis, bruising, swelling, diplopia, temporary effect, incomplete chemical denervation.
Lot #: [id number]
Show Inventory Tab: Hide
Additional Area 1 Location: Nancie Cervantes

## 2022-04-01 NOTE — PROCEDURE: MICRONEEDLING
Depth In Mm: 0.1
Detail Level: Zone
Treatment Number (Optional): 2
Consent: Written consent obtained, risks reviewed including but not limited to pain, scarring, infection and incomplete improvement. Patient understands the procedure is cosmetic in nature and will require out of pocket payment.
Location #1: face
Topical Anesthesia?: 20% benzocaine, 8% lidocaine, 4% tetracaine
Post-Care Instructions: After the procedure, take precautions agains sun exposure. Do not apply sunscreen for 12 hours after the procedure. Do not apply make-up for 12 hours after the procedure. Avoid alcohol based toners for 10-14 days. After 2-3 days patients can return to their regular skin regimen. \\nFace tip Lot # [id number]
Depth In Mm: 0.8
Infusions (Optional): PRP

## 2022-04-01 NOTE — PROCEDURE: QWO INJECTION
Detail Level: Detailed
Show Inventory Tab: Hide
Expiration Date (Month Year): 01/23
Procedure Text: The treatment areas were cleansed. Qwo was administered by injecting 0.3ml in three equal aliquots in each dimple in the subcutaneous plane at 90 degrees once and 45 degrees twice using a 27 gauge needle.
Number Of Sites Treated (Won't Render If 0): 8
Treatment Area: buttocks
Qwo Volume In Ml (Won't Render If 0): 9
Vials Used (Won't Render If 0 - Required If Using Inventory): 0
Post-Care Instructions: Patient was instructed to not place pressure on the area for 5 minutes. Exercise and strenous activity are to be avoided for 48 hours.
Lot #: [id number]
Consent: Written consent was obtained. Risks were disclosed to include but not limited to bruising, swelling, numbness, beading, irregular texture or contour, ulceration, infection, allergic reaction, scar formation, damage to nerves, incomplete result, temporary nature, and procedural pain.

## 2022-05-05 ENCOUNTER — APPOINTMENT (RX ONLY)
Dept: URBAN - METROPOLITAN AREA CLINIC 23 | Facility: CLINIC | Age: 32
Setting detail: DERMATOLOGY
End: 2022-05-05

## 2022-05-05 DIAGNOSIS — Z41.9 ENCOUNTER FOR PROCEDURE FOR PURPOSES OTHER THAN REMEDYING HEALTH STATE, UNSPECIFIED: ICD-10-CM

## 2022-05-05 PROCEDURE — ? QWO INJECTION

## 2022-05-05 NOTE — PROCEDURE: QWO INJECTION
Treatment Area: buttocks
Treatment Number (Won't Render If 0): 2
Show Inventory Tab: Hide
Post-Care Instructions: Patient was instructed to not place pressure on the area for 5 minutes. Exercise and strenous activity are to be avoided for 48 hours.
Detail Level: Detailed
Vials Used (Won't Render If 0 - Required If Using Inventory): 1
Lot #: [id number]
Expiration Date (Month Year): 01/2023
Consent: Written consent was obtained. Risks were disclosed to include but not limited to bruising, swelling, numbness, beading, irregular texture or contour, ulceration, infection, allergic reaction, scar formation, damage to nerves, incomplete result, temporary nature, and procedural pain.
Qwo Volume In Ml (Won't Render If 0): 4
Procedure Text: The treatment areas were cleansed. Qwo was administered by injecting 0.3ml in three equal aliquots in each dimple in the subcutaneous plane at 90 degrees once and 45 degrees twice using a 27 gauge needle.
Number Of Sites Treated (Won't Render If 0): 9[address] [facility]

## 2022-05-20 ENCOUNTER — APPOINTMENT (RX ONLY)
Dept: URBAN - METROPOLITAN AREA CLINIC 23 | Facility: CLINIC | Age: 32
Setting detail: DERMATOLOGY
End: 2022-05-20

## 2022-05-20 DIAGNOSIS — Z41.9 ENCOUNTER FOR PROCEDURE FOR PURPOSES OTHER THAN REMEDYING HEALTH STATE, UNSPECIFIED: ICD-10-CM

## 2022-05-20 PROCEDURE — ? MICRONEEDLING

## 2022-05-20 NOTE — PROCEDURE: MICRONEEDLING
Depth In Mm: 2
Infusions (Optional): PRP
Location #1: face
Consent: Written consent obtained, risks reviewed including but not limited to pain, scarring, infection and incomplete improvement. Patient understands the procedure is cosmetic in nature and will require out of pocket payment.
Post-Care Instructions: After the procedure, take precautions agains sun exposure. Do not apply sunscreen for 12 hours after the procedure. Do not apply make-up for 12 hours after the procedure. Avoid alcohol based toners for 10-14 days. After 2-3 days patients can return to their regular skin regimen. \\nFace tip Lot # [id number]
Depth In Mm: 0.1
Treatment Number (Optional): 0
Depth In Mm: 0.8
Detail Level: Zone
Topical Anesthesia?: 20% benzocaine, 8% lidocaine, 4% tetracaine

## 2022-05-26 ENCOUNTER — APPOINTMENT (RX ONLY)
Dept: URBAN - METROPOLITAN AREA CLINIC 23 | Facility: CLINIC | Age: 32
Setting detail: DERMATOLOGY
End: 2022-05-26

## 2022-05-26 DIAGNOSIS — Z41.9 ENCOUNTER FOR PROCEDURE FOR PURPOSES OTHER THAN REMEDYING HEALTH STATE, UNSPECIFIED: ICD-10-CM

## 2022-05-26 PROCEDURE — ? QWO INJECTION

## 2022-05-26 NOTE — PROCEDURE: QWO INJECTION
Qwo Volume In Ml (Won't Render If 0): 4
Consent: Written consent was obtained. Risks were disclosed to include but not limited to bruising, swelling, numbness, beading, irregular texture or contour, ulceration, infection, allergic reaction, scar formation, damage to nerves, incomplete result, temporary nature, and procedural pain.
Show Inventory Tab: Show
# Of Treatments In Package: 0
Procedure Text: The treatment areas were cleansed. Qwo was administered by injecting 0.3ml in three equal aliquots in each dimple in the subcutaneous plane at 90 degrees once and 45 degrees twice using a 27 gauge needle. \\n\\nLot # [id number] Exp: 07/23
Post-Care Instructions: Patient was instructed to not place pressure on the area for 5 minutes. Exercise and strenous activity are to be avoided for 48 hours.
Number Of Sites Treated (Won't Render If 0): 12
Treatment Number (Won't Render If 0): 2
Detail Level: Detailed
Treatment Area: buttocks

## 2022-06-07 ENCOUNTER — APPOINTMENT (RX ONLY)
Dept: URBAN - METROPOLITAN AREA CLINIC 23 | Facility: CLINIC | Age: 32
Setting detail: DERMATOLOGY
End: 2022-06-07

## 2022-06-07 DIAGNOSIS — Z41.9 ENCOUNTER FOR PROCEDURE FOR PURPOSES OTHER THAN REMEDYING HEALTH STATE, UNSPECIFIED: ICD-10-CM

## 2022-06-07 PROCEDURE — ? FILLERS

## 2022-06-07 PROCEDURE — ? PULSED-DYE LASER

## 2022-06-07 PROCEDURE — ? BOTOX

## 2022-06-07 NOTE — PROCEDURE: BOTOX
Additional Area 6 Location: neck bands
Periorbital Skin Units: [address] [facility]
Show Right And Left Periorbital Units: No
Additional Area 4 Units: 0
Show Additional Area 6: Yes
Additional Area 2 Location: glabella
Additional Area 3 Units: 4
Additional Area 5 Location: bunnies lines
Consent: Written consent obtained. Risks include but not limited to lid/brow ptosis, bruising, swelling, diplopia, temporary effect, incomplete chemical denervation.
Forehead Units: 4 [facility]
Expiration Date (Month Year): 04/24
Additional Area 1 Location: chin
Additional Area 4 Location: lateral eyes
Dilution (U/0.1 Cc): 2.5
Additional Area 3 Location: lateral brows
Post-Care Instructions: Patient instructed to not lie down for 4 hours and limit physical activity for 24 hours. Patient instructed not to travel by airplane for 48 hours.
Glabellar Complex Units: 12
Detail Level: Detailed
Lot #: [id number]
Show Inventory Tab: Show

## 2022-06-07 NOTE — PROCEDURE: FILLERS
[place] Filler  Volume In Cc: 0
Map Statment: See [address] for Complete Details
Number Of Syringes (Required For Inventory): 1
Include Cannula Information In Note?: No
Anesthesia Type: 1% lidocaine with epinephrine
Anesthesia Volume In Cc: 0.5
Inventory Information: This plan will send filler information to inventory based on the fillers you select. Multiple fillers can be sent but you must ensure you select the appropriate fillers in the inventory plan.
Detail Level: Detailed
Additional Area 1 Location: irma oral, lips
Additional Anesthesia Volume In Cc: 6
Additional Area 2 Location: marionette lines, oral commissures,fine lines
Show Inventory Tab: Show
Filler: Restylane Kysse
Additional Area 3 Location: chin
Additional Area 4 Location: cheeks
Additional Area 3 Location: tear through, vermillion lips, lips, marionette lines.
Consent: Written consent obtained. Risks include but not limited to bruising, beading, irregular texture, ulceration, infection, allergic reaction, scar formation, incomplete augmentation, temporary nature, procedural pain.
Post-Care Instructions: Patient instructed to apply ice to reduce swelling.

## 2022-06-07 NOTE — PROCEDURE: PULSED-DYE LASER
Pulse Duration: 6 ms
Pulse Duration: .45 ms
Pulse Duration: 10 ms
Consent: Written consent obtained, risks reviewed including but not limited to crusting, scabbing, blistering, scarring, darker or lighter pigmentary change, incidental hair removal, bruising, and/or incomplete removal.
Cryogen Time (Ms): [address] Renato Yanez
Treated Area: large area
Cryogen Time (Ms): 30
Detail Level: Detailed
Treated Area: small area
Spot Size: 7 mm
Post-Care Instructions: I reviewed with the patient in detail post-care instructions. Patient should stay away from the sun and wear sun protection until treated areas are fully healed.
Delay Time (Ms): 4903 [facility]
Immediate Endpoint: erythema
Fluence In J/Cm2 (Optional): 4.00
Spot Size: 10x3 mm
Delay Time (Ms): [address]
Location Override: post cosmetic bruising
Delay Time (Ms): 10
Spot Size: 10 mm
Delay Time (Ms): 20
Laser Type: Vbeam 595nm
Fluence In J/Cm2 (Optional): 6.50

## 2022-07-14 ENCOUNTER — APPOINTMENT (RX ONLY)
Dept: URBAN - METROPOLITAN AREA CLINIC 23 | Facility: CLINIC | Age: 32
Setting detail: DERMATOLOGY
End: 2022-07-14

## 2022-07-14 DIAGNOSIS — Z41.9 ENCOUNTER FOR PROCEDURE FOR PURPOSES OTHER THAN REMEDYING HEALTH STATE, UNSPECIFIED: ICD-10-CM

## 2022-07-14 PROCEDURE — ? PULSED-DYE LASER

## 2022-07-14 NOTE — PROCEDURE: PULSED-DYE LASER
Spot Size: 10x3 mm
Delay Time (Ms): 7[address] [facility]
Spot Size: 3 mm
Fluence In J/Cm2 (Optional): 11:50
Fluence In J/Cm2 (Optional): 12.00
Spot Size: 7 mm
Delay Time (Ms): 20
Pulse Duration: 10 ms
Laser Type: Vbeam 595nm
Price (Use Numbers Only, No Special Characters Or $): 0.00
Pulse Duration: 1.5 ms
Cryogen Time (Ms): [address] Renato Yanez
Pulse Duration: 40 ms
Consent: Written consent obtained, risks reviewed including but not limited to crusting, scabbing, blistering, scarring, darker or lighter pigmentary change, incidental hair removal, bruising, and/or incomplete removal.
Cryogen Time (Ms): 30
Treated Area: small area
Detail Level: Detailed
Location Override: [facility] [place]
Post-Procedure Care: Vaseline and ice applied. Post care reviewed with patient.
Post-Care Instructions: I reviewed with the patient in detail post-care instructions. Patient should stay away from the sun and wear sun protection until treated areas are fully healed.

## 2022-07-27 ENCOUNTER — RX ONLY (OUTPATIENT)
Age: 32
Setting detail: RX ONLY
End: 2022-07-27

## 2022-07-27 RX ORDER — SPIRONOLACTONE 100 MG/1
TABLET, FILM COATED ORAL
Qty: 60 | Refills: 3 | Status: ERX

## 2022-09-13 ENCOUNTER — APPOINTMENT (RX ONLY)
Dept: URBAN - METROPOLITAN AREA CLINIC 23 | Facility: CLINIC | Age: 32
Setting detail: DERMATOLOGY
End: 2022-09-13

## 2022-09-13 ENCOUNTER — RX ONLY (OUTPATIENT)
Age: 32
Setting detail: RX ONLY
End: 2022-09-13

## 2022-09-13 DIAGNOSIS — Z41.9 ENCOUNTER FOR PROCEDURE FOR PURPOSES OTHER THAN REMEDYING HEALTH STATE, UNSPECIFIED: ICD-10-CM

## 2022-09-13 PROCEDURE — ? BOTOX

## 2022-09-13 RX ORDER — CLINDAMYCIN PHOSPHATE 10 MG/G
GEL TOPICAL
Qty: 30 | Refills: 2 | Status: ERX | COMMUNITY
Start: 2022-09-13

## 2022-09-13 NOTE — PROCEDURE: BOTOX
Additional Area 6 Location: left brow
Right Periorbital Units: 0
Consent: Written consent obtained. Risks include but not limited to lid/brow ptosis, bruising, swelling, diplopia, temporary effect, incomplete chemical denervation.
Show Additional Area 4: Yes
Show Right And Left Periorbital Units: No
Additional Area 2 Units: 8
Additional Area 3 Location: lateral eyes
Additional Area 3 Units: 4
Detail Level: Detailed
Post-Care Instructions: Patient instructed to not lie down for 4 hours and limit physical activity for 24 hours. Patient instructed not to travel by airplane for 48 hours.
Additional Area 4 Location: bunny lines
Expiration Date (Month Year): 2024/05
Lot #: [id number]
Show Inventory Tab: Show
Additional Area 1 Location: high forehead
Additional Area 5 Location: neck bands
Dilution (U/0.1 Cc): 2.5
Additional Area 2 Location: glabella

## 2022-09-15 ENCOUNTER — RX ONLY (OUTPATIENT)
Age: 32
Setting detail: RX ONLY
End: 2022-09-15

## 2022-09-15 RX ORDER — KETOCONAZOLE 20 MG/G
CREAM TOPICAL
Qty: 60 | Refills: 1 | Status: ERX | COMMUNITY
Start: 2022-09-15

## 2022-09-27 ENCOUNTER — APPOINTMENT (RX ONLY)
Dept: URBAN - METROPOLITAN AREA CLINIC 23 | Facility: CLINIC | Age: 32
Setting detail: DERMATOLOGY
End: 2022-09-27

## 2022-09-27 DIAGNOSIS — Z41.9 ENCOUNTER FOR PROCEDURE FOR PURPOSES OTHER THAN REMEDYING HEALTH STATE, UNSPECIFIED: ICD-10-CM

## 2022-09-27 PROCEDURE — ? ADDITIONAL NOTES

## 2022-09-27 PROCEDURE — ? DYSPORT

## 2022-09-27 PROCEDURE — ? PULSED-DYE LASER

## 2022-09-27 PROCEDURE — ? PICOWAY LASER

## 2022-09-27 NOTE — PROCEDURE: PULSED-DYE LASER
Treated Area: small area
Detail Level: Detailed
Delay Time (Ms): 7538 [facility]
Fluence In J/Cm2 (Optional): 6:25
Delay Time (Ms): 20
Spot Size: 7 mm
Spot Size: 10x3 mm
Pulse Duration: 10 ms
Spot Size: 10 mm
Pulse Duration: 3 ms
Delay Time (Ms): 0
Cryogen Time (Ms): [address] Renato Yanez
Fluence In J/Cm2 (Optional): 12.00
Cryogen Time (Ms): 30
Consent: Written consent obtained, risks reviewed including but not limited to crusting, scabbing, blistering, scarring, darker or lighter pigmentary change, incidental hair removal, bruising, and/or incomplete removal.
Price (Use Numbers Only, No Special Characters Or $): 200.00
Laser Type: Vbeam 595nm
Post-Care Instructions: I reviewed with the patient in detail post-care instructions. Patient should stay away from the sun and wear sun protection until treated areas are fully healed.
Location (Required For Details To Render In Note But Body Touch Will Also Count For First Location): cheeks

## 2022-09-27 NOTE — PROCEDURE: DYSPORT
Show Nasal Units: Yes
[facility] Units: 0
Show Right And Left Brow Units: No
Detail Level: Zone
Lot #: [id number]
Additional Area 1 Location: neck band
Additional Area 4 Location: lateral brows
Forehead Units: 5
Dilution (U/ 0.1cc): 1.5
Show Inventory Tab: Show
Consent: Written consent obtained. Risks include but not limited to lid/brow ptosis, bruising, swelling, diplopia, temporary effect, incomplete chemical denervation.
Additional Area 5 Location: under eyes
Expiration Date (Month Year): 2022-10
Post-Care Instructions: Patient instructed to not lie down for 4 hours, limit physical activity for 24 hours and avoid manipulation of the treated areas.
Additional Area 3 Location: glabella
Additional Area 6 Location: clinton mitchell

## 2022-09-27 NOTE — PROCEDURE: PICOWAY LASER
Spot Size: 6.0 mm x 6.0 mm
Frequency (Hz): 5
Price (Use Numbers Only, No Special Characters Or $): Pilar 354
Treatment Number: 0
Wavelength: 1064 nm
Handpiece: Resolve
Fluence (J/Cm2): 0.6
Handpiece: Zoom
Pre-Procedure: Prior to proceeding the treatment areas were cleaned and all present put on their eye protection.
Detail Level: Zone
Post-Care Instructions: I reviewed with the patient in detail post-care instructions. Patient should avoid sun for a minimum of 4 weeks before and after treatment.
Spot Size: 8.0 mm
Fluence (J/Cm2): 1.9
Post-Procedure Care: Immediate endpoint: perifollicular erythema and edema. Vaseline and ice applied. Post care reviewed with patient.
Consent: Written consent obtained, risks reviewed including but not limited to crusting, scabbing, blistering, scarring, darker or lighter pigmentary change, paradoxical hair regrowth, incomplete removal of hair and infection.
Fluence (J/Cm2): 1.5
Frequency (Hz): 6Hz
Wavelength: 532 nm
Spot Size: 6.0 mm
Frequency (Hz): 6
Hide Pulse Duration?: No
Topical Anesthesia Type: 6% lidocaine, 6% tetracaine cream

## 2022-09-27 NOTE — PROCEDURE: ADDITIONAL NOTES
Render Risk Assessment In Note?: no
Detail Level: Detailed
Additional Notes: Continue Cyspera \\nRecommend rosacea (turmeric and ceramide base) azelaic acid 15% niacinamide 2% cream kojic acid 6%. \\nPatient will call to confirm which compound cream she have in order to place her on the (rosacea cream)

## 2022-12-23 ENCOUNTER — RX ONLY (OUTPATIENT)
Age: 32
Setting detail: RX ONLY
End: 2022-12-23

## 2022-12-23 RX ORDER — SPIRONOLACTONE 100 MG/1
TABLET, FILM COATED ORAL
Qty: 60 | Refills: 3 | Status: ERX

## 2023-01-25 ENCOUNTER — APPOINTMENT (RX ONLY)
Dept: URBAN - METROPOLITAN AREA CLINIC 23 | Facility: CLINIC | Age: 33
Setting detail: DERMATOLOGY
End: 2023-01-25

## 2023-01-25 DIAGNOSIS — Z41.9 ENCOUNTER FOR PROCEDURE FOR PURPOSES OTHER THAN REMEDYING HEALTH STATE, UNSPECIFIED: ICD-10-CM

## 2023-01-25 PROCEDURE — ? BOTOX

## 2023-01-25 PROCEDURE — ? INVENTORY

## 2023-01-25 PROCEDURE — ? MICRONEEDLING

## 2023-01-25 PROCEDURE — ? FILLERS

## 2023-01-25 NOTE — PROCEDURE: BOTOX
L Brow Units: 0
Show Anterior Platysmal Band Units: Yes
Show Ucl Units: No
Additional Area 2 Location: lateral eyes
Additional Area 6 Location: Lip flip
Detail Level: Detailed
Periorbital Skin Units: 4
Incrementing Botox Units: By 0.5 Units
Dilution (U/0.1 Cc): 2.5
Show Inventory Tab: Show
Additional Area 5 Location: lateral brows
Additional Area 1 Location: Bunnies
Forehead Units: [address] [id number]
Consent: Written consent obtained. Risks include but not limited to lid/brow ptosis, bruising, swelling, diplopia, temporary effect, incomplete chemical denervation.
Glabellar Complex Units: 8
Additional Area 3 Location: chin
Lot #: [id number]
Expiration Date (Month Year): 2024/05
Post-Care Instructions: Patient instructed to not lie down for 4 hours and limit physical activity for 24 hours. Patient instructed not to travel by airplane for 48 hours.

## 2023-01-25 NOTE — PROCEDURE: FILLERS
Tear Troughs Filler  Volume In Cc: 0
Show Inventory Tab: Show
Include Cannula Information In Note?: Yes
Additional Area 2 Location: cheeks, NLF,Lower lip
Include Cannula Information In Note?: No
Additional Area 3 Location: Perioral
Additional Area 1 Location: marionette lines, lateral mouth, vermillion lips
Include Cannula Brand?: DermaSculpt
Number Of Syringes (Required For Inventory): 1
Additional Area 4 Location: right cheek
Additional Area 2 Location: using the cannula to medial cheeks and jawline
Additional Area 3 Location: right hand
Additional Area 5 Location: chin
Anesthesia Type: 1% lidocaine with epinephrine
Map Statment: See [address] for Complete Details
Additional Area 4 Location: irma oral
Anesthesia Volume In Cc: 0.5
Additional Area 1 Location: marionette lines, lateral cheeks, nasolabial folds
Additional Anesthesia Volume In Cc: 6
Detail Level: Detailed
Inventory Information: This plan will send filler information to inventory based on the fillers you select. Multiple fillers can be sent but you must ensure you select the appropriate fillers in the inventory tab.
Additional Area 1 Location: vermillion lips and oral commesure
Additional Area 1 Location: Lt cheeks, lateral nasolabial folds, Rt eyebrow lines,glabella lines
Consent: Written consent obtained. Risks include but not limited to bruising, beading, irregular texture, ulceration, infection, allergic reaction, scar formation, incomplete augmentation, temporary nature, procedural pain.
Post-Care Instructions: Patient instructed to apply ice to reduce swelling.
Filler: Restylane Kysse

## 2023-01-25 NOTE — PROCEDURE: MICRONEEDLING
Infusions (Optional): hyaluronic acid
Depth In Mm: 0.8
Detail Level: Zone
Depth In Mm: 0.1
Treatment Number (Optional): 1
Post-Care Instructions: After the procedure, take precautions agains sun exposure. Do not apply sunscreen for 12 hours after the procedure. Do not apply make-up for 12 hours after the procedure. Avoid alcohol based toners for 10-14 days. After 2-3 days patients can return to their regular skin regimen. \\nFace tip Lot # [id number]
Topical Anesthesia?: 20% benzocaine, 8% lidocaine, 4% tetracaine
Price (Use Numbers Only, No Special Characters Or $): [address]
Depth In Mm: 2
Consent: Written consent obtained, risks reviewed including but not limited to pain, scarring, infection and incomplete improvement. Patient understands the procedure is cosmetic in nature and will require out of pocket payment.

## 2023-01-26 ENCOUNTER — RX ONLY (OUTPATIENT)
Age: 33
Setting detail: RX ONLY
End: 2023-01-26

## 2023-01-26 RX ORDER — PHARMACY COMPOUNDING ACCESSORY
EACH MISCELLANEOUS
Qty: 30 | Refills: 0 | Status: ERX | COMMUNITY
Start: 2023-01-26

## 2023-02-16 ENCOUNTER — APPOINTMENT (RX ONLY)
Dept: URBAN - METROPOLITAN AREA CLINIC 23 | Facility: CLINIC | Age: 33
Setting detail: DERMATOLOGY
End: 2023-02-16

## 2023-02-16 ENCOUNTER — RX ONLY (OUTPATIENT)
Age: 33
Setting detail: RX ONLY
End: 2023-02-16

## 2023-02-16 DIAGNOSIS — Z41.9 ENCOUNTER FOR PROCEDURE FOR PURPOSES OTHER THAN REMEDYING HEALTH STATE, UNSPECIFIED: ICD-10-CM

## 2023-02-16 DIAGNOSIS — Z71.89 OTHER SPECIFIED COUNSELING: ICD-10-CM

## 2023-02-16 PROCEDURE — ? HYDRAFACIAL

## 2023-02-16 PROCEDURE — ? COUNSELING

## 2023-02-16 PROCEDURE — ? PRESCRIPTION SAMPLES PROVIDED

## 2023-02-16 PROCEDURE — ? DYSPORT

## 2023-02-16 RX ORDER — PHARMACY COMPOUNDING ACCESSORY
EACH MISCELLANEOUS
Qty: 30 | Refills: 2 | Status: ERX

## 2023-02-16 ASSESSMENT — LOCATION DETAILED DESCRIPTION DERM: LOCATION DETAILED: LEFT INFERIOR CENTRAL MALAR CHEEK

## 2023-02-16 ASSESSMENT — LOCATION SIMPLE DESCRIPTION DERM: LOCATION SIMPLE: LEFT CHEEK

## 2023-02-16 ASSESSMENT — LOCATION ZONE DERM: LOCATION ZONE: FACE

## 2023-02-16 NOTE — PROCEDURE: DYSPORT
Right Pupillary Line Units: 0
Additional Area 1 Units: 2
Show Additional Area 4: Yes
Show Right And Left Pupillary Line Units: No
Additional Area 4 Location: high forehead
Lot #: [id number]
Additional Area 2 Location: lateral Eyes
Dilution (U/ 0.1cc): 1.5
Detail Level: Zone
Consent: Written consent obtained. Risks include but not limited to lid/brow ptosis, bruising, swelling, diplopia, temporary effect, incomplete chemical denervation.
Post-Care Instructions: Patient instructed to not lie down for 4 hours, limit physical activity for 24 hours and avoid manipulation of the treated areas.
Expiration Date (Month Year): 2022-10
Additional Area 6 Location: [facility]
Additional Area 1 Location: chin
Show Inventory Tab: Show

## 2023-02-16 NOTE — PROCEDURE: HYDRAFACIAL
Vacuum Pressure Low Setting (Will Not Render If Set To 0): 0
Solution Override
Tip: Hydropeel Tip, Blue
Indication: skin texture
Procedure: Extend and Protect
Consent: Written consent obtained, risks reviewed including but not limited to crusting, scabbing, blistering, scarring, darker or lighter pigmentary change, bruising, and/or incomplete response.
Procedure: Exfoliation
Tip: Hydropeel Tip, Teal
Post-Care Instructions: I reviewed with the patient in detail post-care instructions. Patient should stay away from the sun and wear sun protection until treated areas are fully healed.
Location: face
Procedure: Extraction
Tip: Hydropeel Tip, Clear
Solution: Activ-4
Procedure: Peel
Procedure: Fusion
Solution: Beta-HD
Procedure: Boost
Treatment Number: 1
Tip Override
Solution: GlySal 7.5%
Price (Use Numbers Only, No Special Characters Or $): [id number]

## 2023-02-16 NOTE — PROCEDURE: PRESCRIPTION SAMPLES PROVIDED
Lot/Batch Number (Optional): [id number]
Expiration Date (Optional): Jul 2023
Samples Given: Jane Kwon
Detail Level: Zone

## 2023-03-16 ENCOUNTER — APPOINTMENT (RX ONLY)
Dept: URBAN - METROPOLITAN AREA CLINIC 23 | Facility: CLINIC | Age: 33
Setting detail: DERMATOLOGY
End: 2023-03-16

## 2023-03-16 DIAGNOSIS — Z41.9 ENCOUNTER FOR PROCEDURE FOR PURPOSES OTHER THAN REMEDYING HEALTH STATE, UNSPECIFIED: ICD-10-CM

## 2023-03-16 PROCEDURE — ? HYDRAFACIAL

## 2023-03-16 ASSESSMENT — LOCATION DETAILED DESCRIPTION DERM: LOCATION DETAILED: LEFT INFERIOR CENTRAL MALAR CHEEK

## 2023-03-16 ASSESSMENT — LOCATION ZONE DERM: LOCATION ZONE: FACE

## 2023-03-16 ASSESSMENT — LOCATION SIMPLE DESCRIPTION DERM: LOCATION SIMPLE: LEFT CHEEK

## 2023-07-05 ENCOUNTER — APPOINTMENT (RX ONLY)
Dept: URBAN - METROPOLITAN AREA CLINIC 23 | Facility: CLINIC | Age: 33
Setting detail: DERMATOLOGY
End: 2023-07-05

## 2023-07-05 DIAGNOSIS — Z41.9 ENCOUNTER FOR PROCEDURE FOR PURPOSES OTHER THAN REMEDYING HEALTH STATE, UNSPECIFIED: ICD-10-CM

## 2023-07-05 PROCEDURE — ? ADDITIONAL NOTES

## 2023-07-05 PROCEDURE — ? EMATRIX

## 2023-07-05 PROCEDURE — ? MICRONEEDLING

## 2023-07-05 PROCEDURE — ? DYSPORT

## 2023-07-05 PROCEDURE — ? INVENTORY

## 2023-07-05 ASSESSMENT — LOCATION SIMPLE DESCRIPTION DERM
LOCATION SIMPLE: RIGHT CHEEK
LOCATION SIMPLE: INFERIOR FOREHEAD
LOCATION SIMPLE: LEFT CHEEK

## 2023-07-05 ASSESSMENT — LOCATION DETAILED DESCRIPTION DERM
LOCATION DETAILED: LEFT CENTRAL MALAR CHEEK
LOCATION DETAILED: RIGHT INFERIOR CENTRAL MALAR CHEEK
LOCATION DETAILED: INFERIOR MID FOREHEAD

## 2023-07-05 ASSESSMENT — LOCATION ZONE DERM: LOCATION ZONE: FACE

## 2023-07-05 NOTE — PROCEDURE: DYSPORT
Show Nasal Units: Yes
Additional Area 5 Units: 0
Show Right And Left Periorbital Units: No
Additional Area 6 Location: lip flip
Dilution (U/ 0.1cc): 1.5
Additional Area 3 Location: lateral eyes
Consent: Written consent obtained. Risks include but not limited to lid/brow ptosis, bruising, swelling, diplopia, temporary effect, incomplete chemical denervation.
Post-Care Instructions: Patient instructed to not lie down for 4 hours, limit physical activity for 24 hours and avoid manipulation of the treated areas.
Expiration Date (Month Year): 2022-10
Lot #: [id number]
Detail Level: Zone
Additional Area 1 Location: chin
Show Inventory Tab: Show
Additional Area 2 Location: high forehead
Additional Area 1 Units: 2

## 2023-07-05 NOTE — PROCEDURE: MICRONEEDLING
Location #1: face
Consent: Written consent obtained, risks reviewed including but not limited to pain, scarring, infection and incomplete improvement. Patient understands the procedure is cosmetic in nature and will require out of pocket payment.
Depth In Mm: 0.8
Depth In Mm: 0.1
Post-Care Instructions: After the procedure, take precautions agains sun exposure. Do not apply sunscreen for 12 hours after the procedure. Do not apply make-up for 12 hours after the procedure. Avoid alcohol based toners for 10-14 days. After 2-3 days patients can return to their regular skin regimen. \\nMatrix-Pro tip Lot # [id number] Exp:10/28/23
Infusions (Optional): PRP
Depth In Mm: 2
Price (Use Numbers Only, No Special Characters Or $): 500.00
Detail Level: Zone

## 2023-07-07 ENCOUNTER — RX ONLY (OUTPATIENT)
Age: 33
Setting detail: RX ONLY
End: 2023-07-07

## 2023-07-07 RX ORDER — SPIRONOLACTONE 100 MG/1
TABLET, FILM COATED ORAL
Qty: 60 | Refills: 3 | Status: ERX

## 2023-07-11 ENCOUNTER — RX ONLY (OUTPATIENT)
Age: 33
Setting detail: RX ONLY
End: 2023-07-11

## 2023-07-11 RX ORDER — SPIRONOLACTONE 100 MG/1
TABLET, FILM COATED ORAL
Qty: 60 | Refills: 2 | Status: ERX

## 2023-12-12 ENCOUNTER — RX ONLY (OUTPATIENT)
Age: 33
Setting detail: RX ONLY
End: 2023-12-12

## 2023-12-12 RX ORDER — SPIRONOLACTONE 100 MG/1
TABLET, FILM COATED ORAL
Qty: 60 | Refills: 2 | Status: ERX

## 2025-02-05 ENCOUNTER — APPOINTMENT (OUTPATIENT)
Dept: URBAN - METROPOLITAN AREA CLINIC 23 | Facility: CLINIC | Age: 35
Setting detail: DERMATOLOGY
End: 2025-02-05

## 2025-02-05 DIAGNOSIS — Z41.9 ENCOUNTER FOR PROCEDURE FOR PURPOSES OTHER THAN REMEDYING HEALTH STATE, UNSPECIFIED: ICD-10-CM

## 2025-02-05 PROCEDURE — ? FILLERS

## 2025-02-05 PROCEDURE — ? ADDITIONAL NOTES

## 2025-02-05 PROCEDURE — ? INVENTORY

## 2025-02-05 PROCEDURE — ? RF MICRONEEDLING

## 2025-02-05 NOTE — PROCEDURE: FILLERS
Jawline Filler Volume In Cc: 0
Include Cannula Size?: 25G
Include Cannula Information In Note?: No
Lot #: [id number]
Additional Area 2 Location: left dorsal hand
Detail Level: Zone
Include Cannula Brand?: DermaSculpt
Expiration Date (Month Year): 2024-03
Include Cannula Length?: 1.5 inch
Anesthesia Type: 1% lidocaine without epinephrine
Additional Area 5 Location: vermillion lips and cheeks
Additional Area 1 Location: Marionette lines, NLF’s, Jawline
Additional Area 5 Location: oral commissures, upper lip lines, vermillion lips
Lot #: [id number]
Additional Area 2 Location: chin
Expiration Date (Month Year): 2024-07-31
Include Cannula Length?: 1 inch
Lot #: [id number]
Additional Area 3 Location: marionette lines, lateral mouth,oral commesure
Map Statment: See Attach Map for Complete Details
Additional Area 4 Location: chin, lateral cheeks, NLF?s, marionette lines
Additional Area 1 Location: chin lines, marionette lines (physician sample)
Consent: Written consent obtained. Risks include but not limited to bruising, beading, irregular texture, ulceration, infection, allergic reaction, scar formation, incomplete augmentation, temporary nature, procedural pain.
Post-Care Instructions: Patient instructed to apply ice to reduce swelling.
Additional Area 1 Location: vermillion lips, cheeks
Aspiration Statement: Aspiration was performed prior to injecting site with filler.
Additional Area 5 Location: [facility], marionette lines
Additional Area 1 Volume In Cc: 0.5
Additional Area 1 Location: upper lip lines
Filler: Restylane Eyelight
Additional Area 2 Location: marionette lines, cheeks

## 2025-02-05 NOTE — PROCEDURE: ADDITIONAL NOTES
Detail Level: Zone
Render Risk Assessment In Note?: no
Additional Notes: [facility] Rx refilled:\\nTretinoin: 0.05%\\nAzelaic Acid: 8%\\nSodium Hyaluronate: 0.25%

## 2025-02-05 NOTE — PROCEDURE: RF MICRONEEDLING
Laser Override (Optional): pro matrix
Fluence In J/Cm2 (Optional): 1.0
Depth In Mm: 2.1
Rf Time In Msec (Optional): RF= 1.0J
Laser: Endymed Intensif
Indication: skin tightening and resurfacing
Laser: Lutronic Genius
Information: You must select either a Location or Location Override for the laser information to render in the note
Treatment Number (Optional): 0
Pre-Procedure Text: The treatment areas were cleansed in the usual fashion and treatment proceeded as outlined above.
Location #2: Cheeks
Treatment Number (Optional): 1
Depth In Mm: 1.6
Detail Level: Detailed
Location #3: Chin
Post-Care Instructions: After the procedure, take precautions agains sun exposure. Do not apply sunscreen for 12 hours after the procedure. Do not apply make-up for 12 hours after the procedure. Avoid alcohol based toners for 10-14 days. After 2-3 days patients can return to their regular skin regimen.
Anesthesia Type: 1% lidocaine with epinephrine and a 1:10 solution of 8.4% sodium bicarbonate
Location #1: Forehead
Topical Anesthesia Type: BLT ointment (benzocaine 20%, lidocaine 10%, tetracaine 10%)
Depth In Mm: 1.3
Rf Time In Msec (Optional): RF= 0.5
Rf Time In Msec (Optional): RF: 1.0J
Consent: Written consent obtained, risks reviewed including but not limited to pain, scarring, infection and incomplete improvement.  Patient understands the procedure is cosmetic in nature and will require out of pocket payment.
Price (Use Numbers Only, No Special Characters Or $): 1200

## 2025-06-19 NOTE — PROCEDURE: EMATRIX
Detail Level: Zone
Price (Use Numbers Only, No Special Characters Or $): [facility]
Consent: Written consent obtained, risks reviewed including but not limited to crusting, scabbing, blistering, scarring, darker or lighter pigmentary change, paradoxical hair regrowth, incomplete removal of hair and infection.
Use Hsv Prophylaxis: No
Post-Care Instructions: I reviewed with the patient in detail post-care instructions. Patient should avoid sun for a minimum of 4 weeks before and after treatment.
Fluence: 45 J
Program: GARY
Endpoint: Immediate endpoint: perifollicular erythema and edema. Post care reviewed with patient.
(4) no limitation
yes